# Patient Record
Sex: MALE | Race: BLACK OR AFRICAN AMERICAN | Employment: OTHER | URBAN - METROPOLITAN AREA
[De-identification: names, ages, dates, MRNs, and addresses within clinical notes are randomized per-mention and may not be internally consistent; named-entity substitution may affect disease eponyms.]

---

## 2017-05-11 RX ORDER — ATORVASTATIN CALCIUM 80 MG/1
80 TABLET, FILM COATED ORAL NIGHTLY
Qty: 30 TABLET | Refills: 0 | Status: ON HOLD
Start: 2017-05-11 | End: 2017-07-26 | Stop reason: HOSPADM

## 2017-05-11 RX ORDER — CLOPIDOGREL BISULFATE 75 MG/1
75 TABLET ORAL DAILY
Qty: 30 TABLET | Refills: 0 | Status: ON HOLD
Start: 2017-05-11 | End: 2017-07-26 | Stop reason: HOSPADM

## 2017-07-16 PROBLEM — K85.00 IDIOPATHIC ACUTE PANCREATITIS WITHOUT INFECTION OR NECROSIS: Status: ACTIVE | Noted: 2017-07-16

## 2017-07-16 PROBLEM — R50.9 FEVER: Status: ACTIVE | Noted: 2017-07-16

## 2017-07-18 ENCOUNTER — HOSPITAL ENCOUNTER (OUTPATIENT)
Dept: VASCULAR LAB | Age: 52
Discharge: HOME | End: 2017-07-19
Admitting: STUDENT IN AN ORGANIZED HEALTH CARE EDUCATION/TRAINING PROGRAM

## 2017-07-27 ENCOUNTER — HOSPITAL ENCOUNTER (OUTPATIENT)
Dept: INTERVENTIONAL RADIOLOGY/VASCULAR | Age: 52
Discharge: HOME | End: 2017-07-27

## 2017-07-27 DIAGNOSIS — K85.00 IDIOPATHIC ACUTE PANCREATITIS WITHOUT INFECTION OR NECROSIS: ICD-10-CM

## 2017-07-28 ENCOUNTER — HOSPITAL ENCOUNTER (OUTPATIENT)
Dept: WOUND CARE | Age: 52
Discharge: HOME | End: 2017-07-28
Attending: PODIATRIST | Admitting: PODIATRIST

## 2017-07-28 VITALS
TEMPERATURE: 98.6 F | RESPIRATION RATE: 16 BRPM | SYSTOLIC BLOOD PRESSURE: 127 MMHG | DIASTOLIC BLOOD PRESSURE: 58 MMHG | HEART RATE: 94 BPM

## 2017-07-28 LAB
GLUCOSE BLD-MCNC: 420 MG/DL (ref 70–99)
PERFORMED ON: ABNORMAL

## 2017-07-28 RX ORDER — LIDOCAINE HYDROCHLORIDE 40 MG/ML
SOLUTION TOPICAL ONCE
Status: DISCONTINUED | OUTPATIENT
Start: 2017-07-28 | End: 2017-07-29 | Stop reason: HOSPADM

## 2017-08-04 ENCOUNTER — HOSPITAL ENCOUNTER (OUTPATIENT)
Dept: WOUND CARE | Age: 52
Discharge: HOME | End: 2017-08-04
Attending: PODIATRIST | Admitting: PODIATRIST

## 2017-08-04 LAB
GLUCOSE BLD-MCNC: 233 MG/DL (ref 70–99)
PERFORMED ON: ABNORMAL

## 2017-08-04 RX ORDER — LIDOCAINE HYDROCHLORIDE 40 MG/ML
SOLUTION TOPICAL ONCE
Status: DISCONTINUED | OUTPATIENT
Start: 2017-08-04 | End: 2017-08-05 | Stop reason: HOSPADM

## 2017-08-10 ENCOUNTER — TELEPHONE (OUTPATIENT)
Dept: WOUND CARE | Age: 52
End: 2017-08-10

## 2017-08-30 ENCOUNTER — TELEPHONE (OUTPATIENT)
Dept: INTERNAL MEDICINE | Age: 52
End: 2017-08-30

## 2017-08-30 ENCOUNTER — OFFICE VISIT (OUTPATIENT)
Dept: INFECTIOUS DISEASES | Age: 52
End: 2017-08-30

## 2017-08-30 VITALS
BODY MASS INDEX: 23.09 KG/M2 | DIASTOLIC BLOOD PRESSURE: 60 MMHG | SYSTOLIC BLOOD PRESSURE: 138 MMHG | WEIGHT: 175 LBS | TEMPERATURE: 98.2 F

## 2017-08-30 DIAGNOSIS — E11.42 TYPE 2 DIABETES MELLITUS WITH DIABETIC POLYNEUROPATHY, WITH LONG-TERM CURRENT USE OF INSULIN (HCC): Primary | ICD-10-CM

## 2017-08-30 DIAGNOSIS — M46.24 OSTEOMYELITIS OF THORACIC REGION (HCC): ICD-10-CM

## 2017-08-30 DIAGNOSIS — Z79.4 TYPE 2 DIABETES MELLITUS WITH DIABETIC POLYNEUROPATHY, WITH LONG-TERM CURRENT USE OF INSULIN (HCC): Primary | ICD-10-CM

## 2017-08-30 PROCEDURE — 3017F COLORECTAL CA SCREEN DOC REV: CPT | Performed by: INTERNAL MEDICINE

## 2017-08-30 PROCEDURE — G8427 DOCREV CUR MEDS BY ELIG CLIN: HCPCS | Performed by: INTERNAL MEDICINE

## 2017-08-30 PROCEDURE — 4004F PT TOBACCO SCREEN RCVD TLK: CPT | Performed by: INTERNAL MEDICINE

## 2017-08-30 PROCEDURE — G8598 ASA/ANTIPLAT THER USED: HCPCS | Performed by: INTERNAL MEDICINE

## 2017-08-30 PROCEDURE — 99215 OFFICE O/P EST HI 40 MIN: CPT | Performed by: INTERNAL MEDICINE

## 2017-08-30 PROCEDURE — 3046F HEMOGLOBIN A1C LEVEL >9.0%: CPT | Performed by: INTERNAL MEDICINE

## 2017-08-30 PROCEDURE — G8420 CALC BMI NORM PARAMETERS: HCPCS | Performed by: INTERNAL MEDICINE

## 2017-08-31 ENCOUNTER — OFFICE VISIT (OUTPATIENT)
Dept: INTERNAL MEDICINE | Age: 52
End: 2017-08-31
Attending: STUDENT IN AN ORGANIZED HEALTH CARE EDUCATION/TRAINING PROGRAM

## 2017-08-31 VITALS
RESPIRATION RATE: 18 BRPM | HEART RATE: 105 BPM | TEMPERATURE: 98.5 F | DIASTOLIC BLOOD PRESSURE: 66 MMHG | SYSTOLIC BLOOD PRESSURE: 126 MMHG | OXYGEN SATURATION: 99 %

## 2017-08-31 DIAGNOSIS — Z12.12 SCREENING FOR COLORECTAL CANCER: ICD-10-CM

## 2017-08-31 DIAGNOSIS — Z12.11 SCREENING FOR COLORECTAL CANCER: ICD-10-CM

## 2017-08-31 DIAGNOSIS — D64.9 ANEMIA, UNSPECIFIED TYPE: ICD-10-CM

## 2017-08-31 DIAGNOSIS — E10.8 TYPE 1 DIABETES MELLITUS WITH COMPLICATION (HCC): Primary | ICD-10-CM

## 2017-08-31 LAB
GLUCOSE BLD-MCNC: 278 MG/DL (ref 70–99)
PERFORMED ON: ABNORMAL

## 2017-08-31 RX ORDER — LOSARTAN POTASSIUM 25 MG/1
50 TABLET ORAL DAILY
Qty: 30 TABLET | Refills: 3 | Status: SHIPPED | OUTPATIENT
Start: 2017-08-31 | End: 2017-09-21 | Stop reason: ALTCHOICE

## 2017-08-31 RX ORDER — ACETAMINOPHEN 325 MG/1
650 TABLET ORAL EVERY 4 HOURS PRN
Qty: 120 TABLET | Refills: 3 | Status: ON HOLD | OUTPATIENT
Start: 2017-08-31 | End: 2018-01-24 | Stop reason: HOSPADM

## 2017-08-31 RX ORDER — MULTIVIT-MIN/IRON FUM/FOLIC AC 7.5 MG-4
1 TABLET ORAL DAILY
Qty: 30 TABLET | Refills: 3 | Status: SHIPPED | OUTPATIENT
Start: 2017-08-31 | End: 2017-12-26 | Stop reason: SDUPTHER

## 2017-08-31 RX ORDER — ATORVASTATIN CALCIUM 80 MG/1
80 TABLET, FILM COATED ORAL DAILY
Qty: 30 TABLET | Refills: 3 | Status: SHIPPED | OUTPATIENT
Start: 2017-08-31 | End: 2018-06-07 | Stop reason: SDUPTHER

## 2017-08-31 RX ORDER — CARVEDILOL 25 MG/1
25 TABLET ORAL 2 TIMES DAILY WITH MEALS
Qty: 60 TABLET | Refills: 3 | Status: SHIPPED | OUTPATIENT
Start: 2017-08-31 | End: 2017-12-26 | Stop reason: SDUPTHER

## 2017-08-31 RX ORDER — CLOPIDOGREL BISULFATE 75 MG/1
75 TABLET ORAL DAILY
Qty: 30 TABLET | Refills: 3 | Status: SHIPPED | OUTPATIENT
Start: 2017-08-31 | End: 2018-06-07 | Stop reason: SDUPTHER

## 2017-08-31 RX ORDER — FUROSEMIDE 20 MG/1
20 TABLET ORAL DAILY
Qty: 60 TABLET | Refills: 3 | Status: ON HOLD | OUTPATIENT
Start: 2017-08-31 | End: 2017-10-11 | Stop reason: HOSPADM

## 2017-09-01 ENCOUNTER — TELEPHONE (OUTPATIENT)
Dept: INTERNAL MEDICINE | Age: 52
End: 2017-09-01

## 2017-09-01 RX ORDER — INSULIN GLARGINE 100 [IU]/ML
28 INJECTION, SOLUTION SUBCUTANEOUS NIGHTLY
Qty: 1 VIAL | Refills: 3 | OUTPATIENT
Start: 2017-09-01 | End: 2017-09-21 | Stop reason: SDUPTHER

## 2017-09-05 ENCOUNTER — HOSPITAL ENCOUNTER (OUTPATIENT)
Dept: WOUND CARE | Age: 52
Discharge: HOME | End: 2017-09-05
Attending: PODIATRIST | Admitting: PODIATRIST

## 2017-09-05 VITALS
RESPIRATION RATE: 16 BRPM | DIASTOLIC BLOOD PRESSURE: 69 MMHG | SYSTOLIC BLOOD PRESSURE: 118 MMHG | HEART RATE: 90 BPM | TEMPERATURE: 98 F

## 2017-09-05 DIAGNOSIS — L97.513 FOOT ULCER, RIGHT, WITH NECROSIS OF MUSCLE (HCC): ICD-10-CM

## 2017-09-05 DIAGNOSIS — I70.25 ATHEROSCLEROSIS OF NATIVE ARTERIES OF THE EXTREMITIES WITH ULCERATION (HCC): Primary | ICD-10-CM

## 2017-09-05 DIAGNOSIS — I73.9 PAD (PERIPHERAL ARTERY DISEASE) (HCC): ICD-10-CM

## 2017-09-05 LAB
GLUCOSE BLD-MCNC: 167 MG/DL (ref 70–99)
PERFORMED ON: ABNORMAL
PREALBUMIN: 22.5 MG/DL (ref 20–40)
SEDIMENTATION RATE, ERYTHROCYTE: 32 MM/HR (ref 0–20)

## 2017-09-05 RX ORDER — HYDROCODONE BITARTRATE AND ACETAMINOPHEN 5; 325 MG/1; MG/1
1 TABLET ORAL EVERY 4 HOURS PRN
COMMUNITY
End: 2017-09-26

## 2017-09-05 RX ORDER — HYDRALAZINE HYDROCHLORIDE 25 MG/1
25 TABLET, FILM COATED ORAL 3 TIMES DAILY
Status: ON HOLD | COMMUNITY
End: 2018-01-24 | Stop reason: HOSPADM

## 2017-09-05 RX ORDER — LIDOCAINE HYDROCHLORIDE 40 MG/ML
2.5 SOLUTION TOPICAL ONCE
Status: COMPLETED | OUTPATIENT
Start: 2017-09-05 | End: 2017-09-05

## 2017-09-05 RX ADMIN — LIDOCAINE HYDROCHLORIDE 2.5 ML: 40 SOLUTION TOPICAL at 13:51

## 2017-09-05 ASSESSMENT — PAIN DESCRIPTION - ORIENTATION
ORIENTATION_2: LEFT
ORIENTATION: RIGHT

## 2017-09-05 ASSESSMENT — PAIN SCALES - GENERAL: PAINLEVEL_OUTOF10: 7

## 2017-09-05 ASSESSMENT — PAIN DESCRIPTION - LOCATION
LOCATION_2: LEG
LOCATION: FOOT

## 2017-09-05 ASSESSMENT — PAIN DESCRIPTION - PROGRESSION
CLINICAL_PROGRESSION: NOT CHANGED
CLINICAL_PROGRESSION_2: NOT CHANGED

## 2017-09-05 ASSESSMENT — PAIN DESCRIPTION - ONSET
ONSET: ON-GOING
ONSET_2: ON-GOING

## 2017-09-05 ASSESSMENT — PAIN DESCRIPTION - DESCRIPTORS
DESCRIPTORS: ACHING;BURNING
DESCRIPTORS_2: ACHING;BURNING

## 2017-09-05 ASSESSMENT — PAIN DESCRIPTION - FREQUENCY: FREQUENCY: INTERMITTENT

## 2017-09-05 ASSESSMENT — PAIN DESCRIPTION - DURATION: DURATION_2: CONTINUOUS

## 2017-09-05 ASSESSMENT — PAIN DESCRIPTION - INTENSITY: RATING_2: 7

## 2017-09-12 ENCOUNTER — HOSPITAL ENCOUNTER (OUTPATIENT)
Dept: WOUND CARE | Age: 52
Discharge: HOME | End: 2017-09-12
Attending: PODIATRIST | Admitting: PODIATRIST

## 2017-09-12 VITALS
RESPIRATION RATE: 18 BRPM | TEMPERATURE: 99.2 F | HEART RATE: 98 BPM | DIASTOLIC BLOOD PRESSURE: 79 MMHG | SYSTOLIC BLOOD PRESSURE: 164 MMHG

## 2017-09-12 RX ORDER — AMOXICILLIN AND CLAVULANATE POTASSIUM 875; 125 MG/1; MG/1
1 TABLET, FILM COATED ORAL 2 TIMES DAILY
Qty: 28 TABLET | Refills: 0 | Status: SHIPPED | OUTPATIENT
Start: 2017-09-12 | End: 2017-09-26

## 2017-09-12 RX ORDER — LIDOCAINE HYDROCHLORIDE 40 MG/ML
2.5 SOLUTION TOPICAL ONCE
Status: COMPLETED | OUTPATIENT
Start: 2017-09-12 | End: 2017-09-12

## 2017-09-12 ASSESSMENT — PAIN DESCRIPTION - PAIN TYPE: TYPE: ACUTE PAIN

## 2017-09-12 ASSESSMENT — PAIN DESCRIPTION - FREQUENCY: FREQUENCY: INTERMITTENT

## 2017-09-12 ASSESSMENT — PAIN DESCRIPTION - DESCRIPTORS: DESCRIPTORS: SHARP

## 2017-09-12 ASSESSMENT — PAIN DESCRIPTION - LOCATION: LOCATION: FOOT

## 2017-09-12 ASSESSMENT — PAIN DESCRIPTION - ORIENTATION: ORIENTATION: RIGHT

## 2017-09-12 ASSESSMENT — PAIN DESCRIPTION - PROGRESSION: CLINICAL_PROGRESSION: NOT CHANGED

## 2017-09-12 ASSESSMENT — PAIN SCALES - GENERAL: PAINLEVEL_OUTOF10: 10

## 2017-09-12 ASSESSMENT — PAIN DESCRIPTION - ONSET: ONSET: ON-GOING

## 2017-09-15 ENCOUNTER — TELEPHONE (OUTPATIENT)
Dept: INTERNAL MEDICINE | Age: 52
End: 2017-09-15

## 2017-09-15 LAB
GRAM STAIN RESULT: ABNORMAL
ORGANISM: ABNORMAL
WOUND/ABSCESS: ABNORMAL

## 2017-09-21 ENCOUNTER — OFFICE VISIT (OUTPATIENT)
Dept: INTERNAL MEDICINE | Age: 52
End: 2017-09-21
Attending: STUDENT IN AN ORGANIZED HEALTH CARE EDUCATION/TRAINING PROGRAM

## 2017-09-21 ENCOUNTER — TELEPHONE (OUTPATIENT)
Dept: WOUND CARE | Age: 52
End: 2017-09-21

## 2017-09-21 VITALS
OXYGEN SATURATION: 100 % | DIASTOLIC BLOOD PRESSURE: 80 MMHG | HEART RATE: 98 BPM | WEIGHT: 195 LBS | SYSTOLIC BLOOD PRESSURE: 167 MMHG | BODY MASS INDEX: 25.73 KG/M2 | TEMPERATURE: 98.4 F | RESPIRATION RATE: 18 BRPM

## 2017-09-21 DIAGNOSIS — R52 PAIN: Primary | ICD-10-CM

## 2017-09-21 RX ORDER — LOSARTAN POTASSIUM 25 MG/1
25 TABLET ORAL DAILY
Qty: 30 TABLET | Refills: 3 | Status: SHIPPED | OUTPATIENT
Start: 2017-09-21 | End: 2017-10-04 | Stop reason: SDUPTHER

## 2017-09-21 RX ORDER — GABAPENTIN 100 MG/1
100 CAPSULE ORAL 3 TIMES DAILY
Qty: 90 CAPSULE | Refills: 3 | Status: SHIPPED | OUTPATIENT
Start: 2017-09-21 | End: 2017-10-26 | Stop reason: SDUPTHER

## 2017-09-21 RX ORDER — INSULIN GLARGINE 100 [IU]/ML
35 INJECTION, SOLUTION SUBCUTANEOUS NIGHTLY
Qty: 1 VIAL | Refills: 3 | Status: SHIPPED | OUTPATIENT
Start: 2017-09-21 | End: 2017-10-26 | Stop reason: SDUPTHER

## 2017-09-26 ENCOUNTER — OFFICE VISIT (OUTPATIENT)
Dept: VASCULAR SURGERY | Age: 52
End: 2017-09-26

## 2017-09-26 ENCOUNTER — HOSPITAL ENCOUNTER (OUTPATIENT)
Dept: WOUND CARE | Age: 52
Discharge: HOME | End: 2017-09-26
Attending: PODIATRIST | Admitting: PODIATRIST

## 2017-09-26 VITALS
DIASTOLIC BLOOD PRESSURE: 73 MMHG | WEIGHT: 196.8 LBS | SYSTOLIC BLOOD PRESSURE: 141 MMHG | TEMPERATURE: 98.8 F | HEART RATE: 100 BPM | BODY MASS INDEX: 25.96 KG/M2 | OXYGEN SATURATION: 98 %

## 2017-09-26 VITALS
DIASTOLIC BLOOD PRESSURE: 67 MMHG | RESPIRATION RATE: 18 BRPM | SYSTOLIC BLOOD PRESSURE: 129 MMHG | TEMPERATURE: 98 F | HEART RATE: 101 BPM

## 2017-09-26 DIAGNOSIS — I70.25 ATHEROSCLEROSIS OF NATIVE ARTERIES OF THE EXTREMITIES WITH ULCERATION (HCC): Primary | ICD-10-CM

## 2017-09-26 PROCEDURE — 3017F COLORECTAL CA SCREEN DOC REV: CPT | Performed by: SURGERY

## 2017-09-26 PROCEDURE — 99204 OFFICE O/P NEW MOD 45 MIN: CPT | Performed by: SURGERY

## 2017-09-26 PROCEDURE — G8598 ASA/ANTIPLAT THER USED: HCPCS | Performed by: SURGERY

## 2017-09-26 PROCEDURE — 4004F PT TOBACCO SCREEN RCVD TLK: CPT | Performed by: SURGERY

## 2017-09-26 PROCEDURE — G8419 CALC BMI OUT NRM PARAM NOF/U: HCPCS | Performed by: SURGERY

## 2017-09-26 PROCEDURE — G8427 DOCREV CUR MEDS BY ELIG CLIN: HCPCS | Performed by: SURGERY

## 2017-09-26 RX ORDER — SULFAMETHOXAZOLE AND TRIMETHOPRIM 800; 160 MG/1; MG/1
1 TABLET ORAL 2 TIMES DAILY
Qty: 20 TABLET | Refills: 0 | Status: SHIPPED | OUTPATIENT
Start: 2017-09-26 | End: 2017-10-06

## 2017-09-26 RX ORDER — LIDOCAINE HYDROCHLORIDE 40 MG/ML
2.5 SOLUTION TOPICAL ONCE
Status: COMPLETED | OUTPATIENT
Start: 2017-09-26 | End: 2017-09-26

## 2017-09-26 RX ADMIN — LIDOCAINE HYDROCHLORIDE 2.5 ML: 40 SOLUTION TOPICAL at 13:45

## 2017-09-26 ASSESSMENT — PAIN DESCRIPTION - LOCATION: LOCATION: FOOT

## 2017-09-26 ASSESSMENT — ENCOUNTER SYMPTOMS
GASTROINTESTINAL NEGATIVE: 1
RESPIRATORY NEGATIVE: 1
EYES NEGATIVE: 1

## 2017-09-26 ASSESSMENT — PAIN DESCRIPTION - PAIN TYPE: TYPE: ACUTE PAIN

## 2017-09-26 ASSESSMENT — PAIN DESCRIPTION - ORIENTATION: ORIENTATION: RIGHT

## 2017-09-26 ASSESSMENT — PAIN DESCRIPTION - DESCRIPTORS: DESCRIPTORS: ACHING

## 2017-09-29 ENCOUNTER — TELEPHONE (OUTPATIENT)
Dept: VASCULAR SURGERY | Age: 52
End: 2017-09-29

## 2017-10-04 RX ORDER — LOSARTAN POTASSIUM 25 MG/1
25 TABLET ORAL DAILY
Qty: 30 TABLET | Refills: 5 | Status: ON HOLD
Start: 2017-10-04 | End: 2017-10-11 | Stop reason: HOSPADM

## 2017-10-06 NOTE — TELEPHONE ENCOUNTER
Still na from patient, if pt shows up for appt on 10/10 please have him see Buck Wilhelm to reschedule

## 2017-10-10 ENCOUNTER — HOSPITAL ENCOUNTER (OUTPATIENT)
Dept: WOUND CARE | Age: 52
Discharge: HOME | End: 2017-10-10
Attending: PODIATRIST | Admitting: PODIATRIST

## 2017-10-10 VITALS
RESPIRATION RATE: 18 BRPM | DIASTOLIC BLOOD PRESSURE: 52 MMHG | SYSTOLIC BLOOD PRESSURE: 85 MMHG | HEART RATE: 125 BPM | TEMPERATURE: 98.2 F

## 2017-10-10 DIAGNOSIS — I70.25 ATHEROSCLEROSIS OF NATIVE ARTERIES OF OTHER EXTREMITIES WITH ULCERATION (HCC): ICD-10-CM

## 2017-10-10 LAB
GLUCOSE BLD-MCNC: 469 MG/DL (ref 70–99)
PERFORMED ON: ABNORMAL

## 2017-10-10 RX ORDER — LIDOCAINE HYDROCHLORIDE 40 MG/ML
2.5 SOLUTION TOPICAL ONCE
Status: COMPLETED | OUTPATIENT
Start: 2017-10-10 | End: 2017-10-10

## 2017-10-10 RX ADMIN — LIDOCAINE HYDROCHLORIDE 2.5 ML: 40 SOLUTION TOPICAL at 14:24

## 2017-10-10 ASSESSMENT — PAIN SCALES - GENERAL: PAINLEVEL_OUTOF10: 8

## 2017-10-10 ASSESSMENT — PAIN DESCRIPTION - PAIN TYPE: TYPE: ACUTE PAIN

## 2017-10-10 ASSESSMENT — PAIN DESCRIPTION - DESCRIPTORS: DESCRIPTORS: SHARP

## 2017-10-10 ASSESSMENT — PAIN DESCRIPTION - ORIENTATION: ORIENTATION: RIGHT

## 2017-10-10 ASSESSMENT — PAIN DESCRIPTION - LOCATION: LOCATION: FOOT

## 2017-10-10 NOTE — PROGRESS NOTES
[facility] [place]  Progress Note and Procedure Note      Maricarmen Barnett  MEDICAL RECORD NUMBER:  [id number]  AGE: 47 y.o. GENDER: male  : 1965  EPISODE DATE:  10/10/2017    Subjective:       Chief Complaint   Patient presents with    Wound Check     f/u right heel & Foot         HISTORY of PRESENT ILLNESS HPI     Maricarmen Barnett is a 47 y.o. male who presents today for wound evaluation. History of Wound Context:  RIGHT posterior heel wound of diabetic neuropathic with sheer  / pressure. Patient relates that he has been discharged from his ECF where he was receiving wound care. He relates that he was discharged without any home health care set up for wound care. He admits that his blood sugars have been high. Patient relates that he has followed up with vascular surgery and is going to be scheduled for a CTA. Patient is confused and when questions states that he has not taken any isulin for a couple of days then stated that he just took some in the car on the way to his appointment.   Wound Pain Timing/Severity: none  Quality of pain: N/A  Severity:  0 / 10   Modifying Factors: None  Associated Signs/Symptoms: drainage and numbness    Ulcer Identification:  Ulcer Type: diabetic and neuropathic  Contributing Factors: edema, diabetes, poor glucose control, shear force and arterial insufficiency          PAST MEDICAL HISTORY        Diagnosis Date    Clostridium difficile diarrhea 2017    PCR    Diabetes mellitus ([address]     Hyperlipidemia     Hypertension     MDRO (multiple drug resistant organisms) resistance 2017    leg    MRSA (methicillin resistant staph aureus) culture positive 06/15/2016    foot; bone; blood; chest abscess    MRSA (methicillin resistant staph aureus) culture positive 2017    foot    Osteomyelitis of left foot ([address]     Type II or unspecified type diabetes mellitus without mention of complication, not stated as uncontrolled     VRE (vancomycin near syncopal past 2x       MEDICATIONS    Current Outpatient Prescriptions on File Prior to Encounter   Medication Sig Dispense Refill    losartan (COZAAR) 25 MG tablet Take 1 tablet by mouth daily 30 tablet 5    insulin glargine (LANTUS) 100 UNIT/ML injection vial Inject 35 Units into the skin nightly 1 vial 3    insulin aspart (NOVOLOG) 100 UNIT/ML injection vial Inject 5 Units into the skin 3 times daily (before meals) 1 vial 3    gabapentin (NEURONTIN) 100 MG capsule Take 1 capsule by mouth 3 times daily 90 capsule 3    hydrALAZINE (APRESOLINE) 25 MG tablet Take 25 mg by mouth 3 times daily      apixaban (ELIQUIS) 5 MG TABS tablet Take 1 tablet by mouth 2 times daily 60 tablet 3    atorvastatin (LIPITOR) 80 MG tablet Take 1 tablet by mouth daily 30 tablet 3    clopidogrel (PLAVIX) 75 MG tablet Take 1 tablet by mouth daily 30 tablet 3    carvedilol (COREG) 25 MG tablet Take 1 tablet by mouth 2 times daily (with meals) 60 tablet 3    acetaminophen (AMINOFEN) 325 MG tablet Take 2 tablets by mouth every 4 hours as needed for Pain 120 tablet 3    Multiple Vitamins-Minerals (MULTIVITAMIN WITH MINERALS) tablet Take 1 tablet by mouth daily 30 tablet 3    furosemide (LASIX) 20 MG tablet Take 1 tablet by mouth daily 60 tablet 3    bismuth subsalicylate (BISMATROL) 262 MG/15ML suspension Take 30 mLs by mouth 4 times daily as needed for Diarrhea  3     No current facility-administered medications on file prior to encounter. REVIEW OF SYSTEMS    Pertinent items are noted in HPI. Review of Systems: A 12 point review of symptoms is unremarkable with the exception of the chief complaint. Patient specifically denies nausea, fever, vomiting, chills, shortness of breath, chest pain, abdominal pain, constipation or difficulty urinating. Patient is confused and somnolent.          Objective:      BP (!) 85/52   Pulse 125   Temp 98.2 °F (36.8 °C) (Oral)   Resp 18     Wt Readings from Last 3 Encounters: 09/26/17 196 lb 12.8 oz (89.3 kg)   09/21/17 195 lb (88.5 kg)   08/30/17 175 lb (79.4 kg)       PHYSICAL EXAM    General Appearance: alert and oriented to person, place and time, well-developed and well-nourished, in no acute distress  Pitting edema noted on the bilateral lower extremities. TMA noted on the left lower extremity with no open wounds noted. Wound noted on the posterior aspect of the right lower extremity. This has deteriorated significantly since he was discharged from the hospital and was last seen at clinic. Wound has fibrotic and nonviable tissue that extends down through and includes the subcutaneous tissue/muscle/fascia. After debridement the wound has a fibrous base. There is  surrounding erythema and warmth noted and there is persistent malodor noted. The wound does not probe or track to bone.         Assessment:     Patient Active Problem List   Diagnosis    Osteomyelitis ([address]    Dyslipidemia    Carotid bruit present    S/P amputation    Abscess of third toe of left foot    [facility]abetic neuropathy [facility]    Ulcer of toe of left foot ([facility]    Onychomycosis    Pain of right heel    PVD [facility]peripheral vascular disease) [facility]    Abscess of foot    Acute renal failure (ARF) [facility]    Dehydration with hyponatremia    Diabetic infection of right foot ([address]    TUTU (acute kidney injury) ([address]    Uncontrolled type 1 diabetes mellitus ([address]    Hypertension    Non compliance w medication regimen    PAD (peripheral artery disease) ([address]    History of osteomyelitis    Anemia    PAD [facility]peripheral artery disease) ([facility]    Foot ulcer ([address]    Diabetic foot ulcer ([address]    Atherosclerosis of native arteries of the extremities with ulceration ([address]    Foot osteomyelitis ([address]    Edema of lower extremity    Wound infection    Wound dehiscence    Post-op pain    Osteomyelitis of left foot ([address]    Surgical wound dehiscence    VRE infection (vancomycin resistant Enterococcus)    Pseudomonas infection    Type 2 diabetes mellitus with diabetic polyneuropathy (HCC)    Type 1 diabetes mellitus with diabetic peripheral angiopathy with gangrene ([address]    Diabetic foot ulcer with osteomyelitis ([address]    Atherosclerosis of native artery of left lower extremity with ulceration of calf (HCC)    Failure of artificial skin graft    Pain management contract agreement    Diabetic ulcer of left foot associated with type 2 diabetes mellitus ([address]    Atherosclerosis of native artery of right lower extremity with ulceration of calf (HCC)    Sepsis ([address]    Left-sided chest wall pain    Essential hypertension    Pure hypercholesterolemia    Chronic osteomyelitis of left foot (HCC)    Elevated troponin    Transaminitis    Pleural effusion    Chest wall abscess    Pneumonia of both lower lobes due to infectious organism    Abscess of chest wall    Osteomyelitis of thoracic region ([address]    Pyogenic inflammation of bone ([address]    Toe gangrene ([address]    Diabetic ketoacidosis without coma associated with type 2 diabetes mellitus ([address]    Diabetic ketoacidosis with coma associated with type 1 diabetes mellitus ([address]    Somnolence    Acute right-sided thoracic back pain    Depression    Encounter for palliative care    Dysphagia    Acute hypoxemic respiratory failure (HCC)    Acute septic pulmonary embolism without acute cor pulmonale (HCC)    MRSA bacteremia    Pain    Encephalopathy acute    Constipation    Acute biliary pancreatitis without infection or necrosis    Fever    Idiopathic acute pancreatitis without infection or necrosis         Excisional Debridement Procedure Note  Indications:  Based on my examination of this patient's wound(s)/ulcer(s) today, debridement is required to promote healing and evaluate the wound base. Performed by: Gautam Nielsen DPM    Consent obtained?  Yes    Time out taken: Yes    Pain Control: Anesthetic: 4% Topical Xylocaine     Debridement: Davis 1 9/26/2017  1:33 PM   Wound Cleansed Rinsed/Irrigated with saline 9/26/2017  1:33 PM   Wound Length (cm) 0.6 cm 9/26/2017  1:33 PM   Wound Width (cm) 0.8 cm 9/26/2017  1:33 PM   Wound Depth (cm)  0.1 9/26/2017  1:33 PM   Calculated Wound Size (cm^2) (l*w) 0.48 cm^2 9/26/2017  1:33 PM   Undermining Starts ___ O'Clock 0 9/26/2017  1:33 PM   Undermining Maxium Distance (cm) 0 9/26/2017  1:33 PM   Wound Assessment Red;Yellow 9/26/2017  1:33 PM   Margins Defined edges 9/26/2017  1:33 PM   Allyson-wound Assessment Brown 9/26/2017  1:33 PM   Non-staged Wound Description Full thickness 9/26/2017  1:33 PM   Red%Wound Bed 50 9/26/2017  1:33 PM   Yellow%Wound Bed 50 9/26/2017  1:33 PM   Drainage Amount Scant 9/26/2017  1:33 PM   Drainage Description Serosanguinous 9/26/2017  1:33 PM   Odor None 9/26/2017  1:33 PM   Number of days: 14     Percent of Wound Debrided: 100%    Total Surface Area Debrided:  18 sq cm     Diabetic/Pressure/Non Pressure Ulcers:  Ulcer: Diabetic ulcer, muscle necrosis    Bleeding:  Minimal    Hemostasis Achieved:  by pressure    Procedural Pain:  0  / 10     Post Procedural Pain:  0 / 10     Response to treatment:  Well tolerated by patient. Plan:   E/M x 30 minutes of a new problem of AMS. Due to patient significantly elevated blood sugar (near 500), hypotension, tachycardia and AMS he was taken to the ED for further evaluation and management. Patient refused to go to the ED. Spoke with patient's sister Natalia Glynn, who thomas his to the appointment, about his symptoms and she agreed that he should go to the ED and she relates that she will make sure he stays to be evaluated and he stated that he was ;eaving once he got there. Concern for sepsis vs acidosis. Although patient's wound is very fibrotic is is not red, hot or swollen. He has a history of OM of the spine and chest wall so there is concern for another source besides the foot.      Patient will be referred to vascular surgery as the Wound Location: Right Plantar Foot     [x] Apply Primary Dressing to wound:       [] Foam/Foam with Border  [] Mepitel/Non-adherent/Xeroform   [] Alginate with Silver    [] Alginate   [] Collagen [] Collagen with Silver     [] Hydrocolloid  [] Hydrafera Blue moistened with saline   [] MediHoney Paste/Gel [] Hydrogel      [x] Santyl with Moisten saline gauze    [] Bactroban/Mupirocin [] Polysporin  [] Other:      Pack wound loosely with  [] Iodoform   [] Plain Packing  [] Other      [x] Cover and Secure with:     [x] Gauze [] ABD [] Stretch bandage roll [] Kerlix   [] Coban [] Ace Wrap [] Cover Roll Tape    [] Other:    Avoid contact of tape with skin. [x] Change dressing: [x] Daily    [] Every Other Day [] Three times per week   [] Once a week [] Do Not Change Dressing   [] Other:                               Off-Loading:   [] Off-loading when: [] walking  [] in bed [] sitting    [] Total non-weight bearing:  [] Right Leg  [] Left Leg     [] Partial weight bearing:   [] Right Leg  [] Left Leg     [x] Assistive Device: [] Walker [] Crutches  [] Wheelchair [] Roll About   [] Surgical shoe/Darco    [] Podus Boot(s)   [x] Prevalon Boot(s) to bilat heels  [] CROW Boot    [] [employer] [] Other:      Dietary:  Important dietary reminders:  1. Increase Protein intake (i.e. Lean meats, fish, eggs, legumes, and yogurt)  2. No added salt  3. If diabetic, follow a diabetic diet and check glucose prior to meals or as instructed by your physician.         Return Appointment:  [] Wound and dressing supply provider:   [] ECF or Home Healthcare:  [] Nurse visit:    [x] Return Appointment: With Dr Gurwinder Strong  in  1Week(s)    [] Ordered tests:       Consults: [] Weight Management [] Diabetes Education [] Vascular Surgery  [] Endocrinology [] Nutrition Counseling  [] Lymphedema Therapy     Your nurse  is:  Jerson Cruz [address]     Electronically signed by Javi Prince RN on 10/10/2017 at [address] Information: Should you

## 2017-10-11 ENCOUNTER — TELEPHONE (OUTPATIENT)
Dept: ENT CLINIC | Age: 52
End: 2017-10-11

## 2017-10-11 NOTE — TELEPHONE ENCOUNTER
Patient needs to have dup, cta and fu with Dr. Ant Spann. Pt is in hospital being dced now. Pt. Refused exam at hospital anb being dced ama. Called pt at hospital, he is very lethargic from the meds but stated he will call me tomorrow. I called his phone and lvm with my phone number.

## 2017-10-13 ENCOUNTER — TELEPHONE (OUTPATIENT)
Dept: ENT CLINIC | Age: 52
End: 2017-10-13

## 2017-10-13 NOTE — TELEPHONE ENCOUNTER
Patient notified that CTA is scheduled for Monday 10/16 at 0800, arrive at [address]. NPO 4 hrs prior. Art Dup 10/16 at 1000. Dr. Eunice Couch will review results with pt in office on 10/17. Pt verbalized understanding and wcb with any questions or change in symptoms.

## 2017-10-16 ENCOUNTER — TELEPHONE (OUTPATIENT)
Dept: ENT CLINIC | Age: 52
End: 2017-10-16

## 2017-10-16 NOTE — TELEPHONE ENCOUNTER
Patient notified that due to him not showing up for is CT and duplex today at the hospital he will need to reschedule his tests with Central Scheduling and also reschedule his appointment with Dr. Ave Sequeira for tomorrow. Also notified pt that if he has any changes in his foot or leg that he needs to go to ER to have it evaluated. Pt. Given number to central scheduling and our office to set up appointments, pt verbalized understanding and wcb if he has any questions.

## 2017-10-18 ENCOUNTER — HOSPITAL ENCOUNTER (OUTPATIENT)
Dept: VASCULAR LAB | Age: 52
Discharge: HOME | End: 2017-10-18
Attending: SURGERY | Admitting: SURGERY

## 2017-10-18 ENCOUNTER — TELEPHONE (OUTPATIENT)
Dept: VASCULAR SURGERY | Age: 52
End: 2017-10-18

## 2017-10-18 DIAGNOSIS — I70.25 ATHEROSCLEROSIS OF NATIVE ARTERIES OF OTHER EXTREMITIES WITH ULCERATION (HCC): ICD-10-CM

## 2017-10-18 DIAGNOSIS — I73.9 PAD (PERIPHERAL ARTERY DISEASE) (HCC): Primary | ICD-10-CM

## 2017-10-18 DIAGNOSIS — L97.519 ULCER OF RIGHT FOOT, UNSPECIFIED ULCER STAGE (HCC): ICD-10-CM

## 2017-10-18 DIAGNOSIS — I70.25 ATHEROSCLEROSIS OF NATIVE ARTERIES OF THE EXTREMITIES WITH ULCERATION (HCC): ICD-10-CM

## 2017-10-18 NOTE — TELEPHONE ENCOUNTER
Patient got upset with CT bc they had stick him several times to start the IV (per CT pt was slurring, jittery and would not hold still), and walked out of the CT-A. I explained to the patient again that Dr. Jasmin Steele needs the CT-A to evaluate what to do next with his leg/foot. I advised the patient once again that it is very important for him to get the test and gave him the number to reschedule . The patient continues to no-show, cancel and refuse any advise that I give him in regards to care for himself and testing. This is the 4th time we have attempted to get testing on the patient.

## 2017-10-26 ENCOUNTER — OFFICE VISIT (OUTPATIENT)
Dept: INTERNAL MEDICINE | Age: 52
End: 2017-10-26
Attending: SURGERY

## 2017-10-26 ENCOUNTER — HOSPITAL ENCOUNTER (OUTPATIENT)
Dept: CT IMAGING | Age: 52
Discharge: HOME | End: 2017-10-26
Admitting: SURGERY

## 2017-10-26 VITALS
DIASTOLIC BLOOD PRESSURE: 74 MMHG | HEIGHT: 73 IN | RESPIRATION RATE: 20 BRPM | OXYGEN SATURATION: 99 % | WEIGHT: 197 LBS | TEMPERATURE: 98.1 F | HEART RATE: 95 BPM | SYSTOLIC BLOOD PRESSURE: 158 MMHG | BODY MASS INDEX: 26.11 KG/M2

## 2017-10-26 DIAGNOSIS — I70.25 ATHEROSCLEROSIS OF NATIVE ARTERIES OF OTHER EXTREMITIES WITH ULCERATION (HCC): ICD-10-CM

## 2017-10-26 DIAGNOSIS — I70.25 ATHEROSCLEROSIS OF NATIVE ARTERY OF LOWER EXTREMITY WITH ULCERATION OF CALF, UNSPECIFIED LATERALITY (HCC): ICD-10-CM

## 2017-10-26 DIAGNOSIS — D64.9 ANEMIA, UNSPECIFIED TYPE: ICD-10-CM

## 2017-10-26 DIAGNOSIS — N17.9 ACUTE RENAL FAILURE, UNSPECIFIED ACUTE RENAL FAILURE TYPE (HCC): ICD-10-CM

## 2017-10-26 DIAGNOSIS — E10.8 TYPE 1 DIABETES MELLITUS WITH COMPLICATION (HCC): Primary | ICD-10-CM

## 2017-10-26 RX ORDER — INSULIN GLARGINE 100 [IU]/ML
40 INJECTION, SOLUTION SUBCUTANEOUS NIGHTLY
Qty: 1 VIAL | Refills: 3 | Status: ON HOLD | OUTPATIENT
Start: 2017-10-26 | End: 2017-11-16

## 2017-10-26 RX ORDER — GABAPENTIN 100 MG/1
200 CAPSULE ORAL 3 TIMES DAILY
Qty: 90 CAPSULE | Refills: 3 | Status: SHIPPED | OUTPATIENT
Start: 2017-10-26 | End: 2017-12-26 | Stop reason: SDUPTHER

## 2017-10-26 RX ORDER — ACETAMINOPHEN 325 MG/1
650 TABLET ORAL EVERY 6 HOURS PRN
Qty: 120 TABLET | Refills: 3 | Status: ON HOLD | OUTPATIENT
Start: 2017-10-26 | End: 2018-03-12 | Stop reason: HOSPADM

## 2017-10-26 NOTE — PROGRESS NOTES
Department Of Internal Medicine  General Medicine/Primary Care  Established Patient Visit    Patient:  Akanksha Rogerspool                                               : 1965  Age: 47 y.o. MRN: [id number]  Date : 10/26/2017    History Obtained From:  patient    REASON FOR VISIT:  Follow-up    HISTORY OF PRESENT ILLNESS:   Mr. Marifer Obando is a 49yo gentleman with a history of type 1 DM, HTN, PAD, hx of DVT who presents for follow-up visit. Brought his glucometer only checked sugar few times this month. Most recent sugar was 295 this morning. He is taking lantus at 30u instead of 35. He has knee pain requesting tylenol #3. He is now in wheel chair due to knee pain. Due to see Dr. Paty Jane in early November.     Denies chest pain, dyspnea, abdominal pain, n/v.    Past Medical History:        Diagnosis Date    Clostridium difficile diarrhea 2017    PCR    Diabetes mellitus ([address]     Hyperlipidemia     Hypertension     MDRO (multiple drug resistant organisms) resistance 2017    leg    MRSA (methicillin resistant staph aureus) culture positive 06/15/2016    foot; bone; blood; chest abscess    MRSA (methicillin resistant staph aureus) culture positive 2017    foot    Osteomyelitis of left foot ([address]     Type II or unspecified type diabetes mellitus without mention of complication, not stated as uncontrolled     VRE (vancomycin resistant enterococcus) culture positive 8/25/15    foot       Past Surgical History:        Procedure Laterality Date    ABSCESS DRAINAGE Left 2016    Dr. Wil Woodson - chest wall, I&D of phlegmon, placement of wound vac    FEMORAL-TIBIAL BYPASS GRAFT Left 2015    FIRST RIB REMOVAL Left 2016    Dr. Wil Woodson - via trans-axillary approach    FOOT DEBRIDEMENT Left 2015    FOOT DEBRIDEMENT Left 2015    FOOT DEBRIDEMENT Left 2015    w/delayed primary closure    FOOT SURGERY Left 2015    INCISION AND DRAINAGE LEFT FOOT WITH DEBRIDEMENT    FOOT 8/31/17  Yes Hailey Bruner MD   atorvastatin (LIPITOR) 80 MG tablet Take 1 tablet by mouth daily 8/31/17  Yes Hailey Bruner MD   clopidogrel (PLAVIX) 75 MG tablet Take 1 tablet by mouth daily 8/31/17  Yes Hailey Bruner MD   carvedilol (COREG) 25 MG tablet Take 1 tablet by mouth 2 times daily (with meals) 8/31/17  Yes Hailey Bruner MD   acetaminophen (AMINOFEN) 325 MG tablet Take 2 tablets by mouth every 4 hours as needed for Pain 8/31/17  Yes Hailey Bruner MD   Multiple Vitamins-Minerals (MULTIVITAMIN WITH MINERALS) tablet Take 1 tablet by mouth daily 8/31/17  Yes Hailey Bruner MD       Review of Systems   All other systems reviewed and are negative. Physical Exam:      Vitals: BP (!) 158/74 (Site: Right Arm, Position: Sitting, Cuff Size: Medium Adult)   Pulse 95   Temp 98.1 °F (36.7 °C) (Oral)   Resp 20   Ht 6' 1\" (1.854 m)   Wt 197 lb (89.4 kg)   SpO2 99%   BMI 25.99 kg/m²     Body mass index is 25.99 kg/m². Wt Readings from Last 3 Encounters:   10/26/17 197 lb (89.4 kg)   10/10/17 190 lb (86.2 kg)   09/26/17 196 lb 12.8 oz (89.3 kg)       · Gen - Alert, no signs of distress, appears stated age and cooperative  · HEENT - NC/AT  · Eye - Normal external eye, conjunctiva, lids cornea, PERLLA, no nystagmus  · Ears - Normal TM's bilaterally. Normal auditory canals and external ears. Non-tender  · Nose - Normal external nose, mucus membranes and septum  · Pharynx - Dental Hygiene adequate. Normal buccal mucosa. Normal pharynx  · Neck / Thyroid - Supple, no masses, nodes, nodules or enlargement. No adenopathy, no carotid bruit, no JVD, supple, symmetrical, trachea midline and thyroid not enlarged, symmetric, no tenderness/mass/nodules  · CVS - Regular rate. Regular rhythm. No mumur or rub. 1+ edema b/l  · Resp - No accessory muscle use. No crackles. No wheezing. No rhonchi  · GI - Non-tender. Non-distended. No masses. No organmegaly. Normal bowel sounds  · Skin - Warm and dry. No nodule on exposed extremities.  No rash on exposed extremities  · Lymph - No cervical LAD. No supraclavicular LAD. · MSK - R great toe amputation and heel ulcer wrapped. He has small ulcer on R 5th toe. L foot s/p trans-metatarsal amputation. Has 1+ edema b/l  · Neuro - Awake. Follows commands. CN II-XII Grossly Intact. Sensation intact. Strength 5/5 UE and LE. Reflexes 1+ UE and LE kristian. Downgoing plantar kristian. Normal Gait. Finger-to-nose and rapidly alternating movements intact. Normal pain response  · Psych - Oriented to person, place, time. No anxiety or agitation. LABS:    Chemistry:  No results for input(s): BUN, CREATININE, NA, K, CO2, CL, MG, PHOS, AST, ALT, ALB, PROT in the last 72 hours. Invalid input(s): GLU, CA, TBILI, DBILI, ALP, GLUFASTING    No results for input(s): ALKPHOS, ALT, AST, PROT, BILITOT, BILIDIR, LABALBU in the last 72 hours. [unfilled]    No results for input(s): Gertrude Olea, LABMICR, MICROALBUR, Beatriz Coon in the last 72 hours. Lab Results   Component Value Date    TSH 1.37 04/23/2014       Hematology:  No results for input(s): WBC, HGB, HCT, PLT, MCV, MCH, MCHC, RDW, EOSABS in the last 72 hours. Invalid input(s): NEUTP, LYMPHP, MONOSP, EOSP, BASOP, NEUTABS, LYMPHABS, MONOABS, BASOABS    Lab Results   Component Value Date    IRON 98 07/17/2017    TIBC 120 (L) 07/17/2017    FERRITIN 3,803.0 (H) 07/17/2017       Lipid:  Lab Results   Component Value Date    CHOL 189 07/14/2017    HDL 36 (L) 07/14/2017    LDLCALC 99 07/14/2017    TRIG 269 (H) 07/14/2017       U/A:  Lab Results   Component Value Date    LABMICR Yes 10/10/2017       Imaging:   No results found.     EKG:   Health Maintenance   Flex Sig/ Colonoscopy: referral to Dr. Kari james   PSA: defer   INFLUENZA: defer              PNEUMONIA VACCINE: has 2014   Tdap: defer       Active Problems:     Type 1 Diabetes Mellitus  HFpEF  HTN  HLD  PAD  Hx of PE  Anemia of chronic disease    Assessment/Plan:     Type 1 Diabetes Mellitus  - sugars still not controlled, 295 this morning I encouraged that was good he brought his glucometer but he needs to check his suagrs 2-3 times per day  - Advised him to reduce carb and sodium intake  - Advised him to take Lantus 40 units nightly  -Continue Novolog 5 units TID  - A1C was 8.4 in 7/19/2017 will recheck today  - now taking losartan  - Sees Pod[facility]ry for [facility], f/u Dr. Aby Mancera November  - Reminded him to call for eye exam again    Right Diabetic foot wound  - Management per Dr. Aby Mancera, may need vascular consultation  - Will increase Gabapentin to 200mg TID for pain   - refilled tylenol    Knee Pain  - Will try knee brace    HLD  - continue atorvastatin    HFpEF  - LVEF 65% in 07/18/2017  - has 1+ lower extremity edema which is non-pitting  - continue compression stockings     HTN (167/80 today but not taking his cozaar)  - continue coreg 25mg BID  - continue cozaar 50mg daily    PAD  - continue plavix 75mg daily    Hx of PE  - continue eliquis 5mg BID    Anemia of chronic disease  - check CBC, last hemoglobin 11.8 and normocytic anemia    Health Maintenance  - colonoscopy referral last visit  - had PCV23 in 2014  - continue multivitamin  - Told him to get flu vaccine this season    Case will be discussed with preceptor. MD Viky Orantes  Pager: [phone]  10/26/2017, 2:28 PM     Addendum to Resident H& P/Progress note:  I have personally seen,examined and evaluated the patient.  I have reviewed the current history, physical findings, labs and assessment and plan and agree with note as documented by resident MD ( Ariadne Rooney)      Ruy Amezcua MD, Farhana Weston

## 2017-10-26 NOTE — PATIENT INSTRUCTIONS
Before any of you medication is completely gone, call your pharmacy AT LEAST 1 WEEK ahead for refills. Review all information regarding your medication before starting. If you become ill when the clinic is closed, please call the [facility] [employer]  at   #[phone] and ask the  to page the AOD between 6:00 AM and 6:00 PM or page the AON between 6:00 PM and 6:00 am    Labs are done Tuesday thru Friday from 8:00 to 9:00 AM. For fasting labs do not eat anything for 12 hours prior. You may drink water. The clinic is not able to process MY CHART requests for appointments or messages including requests. Please call the [address] [place] [address] at [phone]  For appointments and messages. Call your pharmacy for medication refills. bloodwork done today.     Return to clinic in 1-2 months    Lantus Insulin inject 40 units into the skin at night    Continue medication as listed on discharge sheet    Instructions reviewed before discharge and copy given to patient    [address] [phone]

## 2017-10-26 NOTE — PROGRESS NOTES
[facility]       NURSING PROGRESS NOTE      October 26, 2017  Kyle Mckinney    Chief Complaint:   Chief Complaint   Patient presents with    Check-Up    Medication Refill       Constitutional: negative  Eyes: negative  Ears, nose, mouth, throat, and face: negative  Respiratory: negative  Cardiovascular: negative  Gastrointestinal: negative  Genitourinary: negative  Integument/breast: negative  Hematologic/lymphatic: negative  Musculoskeletal: positive for knee pain and foot pain  Neurological: negative  Diabetes: Yes  Yes:  Medication compliance:  compliant most of the time  Diabetic diet compliance:  compliant most of the time  Home glucose monitoring:  is performed sporadically  Last eye exam:    Acute symptoms hyperglycemia:  none  Acute symptoms hypoglycemia:  none  POC glucose today:  mg/dL  Sugar at home this am 295    Pain Assessment:  Pain Present: yes  Pain Score: 8, but laughing and talking to me  Pain Quality/Description: Aching and Sharp    Mobility/Safety/Self-Care:  Steady Gait: No  History of Falls: No   History of a Fall within the last 30 days No  Assistive Device: Wheelchair  Poor Hygiene: No    Psychosocial:   Depression: No  If YES,    Does Patient express current thoughts of harming self or others?: No  Anxious: No  Insomnia: No  Inappropriate Behavior: No  Alcohol Abuse: no  Substance Abuse: no  Signs of Physical Abuse: No  Signs of Emotional Abuse: No    Educational:  Identify the learner who is being assessed for education:  patient                    Ability to Learn:  Exhibits ability to grasp concepts and respond to questions: Medium  Ready to Learn: Yes  calm   Preferred Method of Learning:  written  Barriers to Learning: none  Special Considerations due to cultural, Confucianism, spiritual beliefs:  No  Language:  English  :  No    Note:   Here for follow up      2:00 PM 10/26/2017

## 2017-10-31 ENCOUNTER — OFFICE VISIT (OUTPATIENT)
Dept: VASCULAR SURGERY | Age: 52
End: 2017-10-31

## 2017-10-31 VITALS
HEIGHT: 73 IN | DIASTOLIC BLOOD PRESSURE: 79 MMHG | OXYGEN SATURATION: 99 % | HEART RATE: 94 BPM | SYSTOLIC BLOOD PRESSURE: 168 MMHG | TEMPERATURE: 98.3 F

## 2017-10-31 DIAGNOSIS — I70.232 ATHEROSCLEROSIS OF NATIVE ARTERY OF RIGHT LOWER EXTREMITY WITH ULCERATION OF CALF (HCC): Primary | ICD-10-CM

## 2017-10-31 PROCEDURE — G8427 DOCREV CUR MEDS BY ELIG CLIN: HCPCS | Performed by: SURGERY

## 2017-10-31 PROCEDURE — 3017F COLORECTAL CA SCREEN DOC REV: CPT | Performed by: SURGERY

## 2017-10-31 PROCEDURE — 1111F DSCHRG MED/CURRENT MED MERGE: CPT | Performed by: SURGERY

## 2017-10-31 PROCEDURE — G8598 ASA/ANTIPLAT THER USED: HCPCS | Performed by: SURGERY

## 2017-10-31 PROCEDURE — G8419 CALC BMI OUT NRM PARAM NOF/U: HCPCS | Performed by: SURGERY

## 2017-10-31 PROCEDURE — G8484 FLU IMMUNIZE NO ADMIN: HCPCS | Performed by: SURGERY

## 2017-10-31 PROCEDURE — 99214 OFFICE O/P EST MOD 30 MIN: CPT | Performed by: SURGERY

## 2017-10-31 PROCEDURE — 4004F PT TOBACCO SCREEN RCVD TLK: CPT | Performed by: SURGERY

## 2017-10-31 NOTE — PATIENT INSTRUCTIONS
Patient Education        Peripheral Artery Angioplasty: Before Your Procedure  What is peripheral artery angioplasty? Peripheral artery angioplasty (say \"khy-APDG-sm-leana RIVERA-ter-bryce RICHARDWRA-mrp-hp-[facility]-zachery\") is a procedure to treat peripheral arterial disease of the legs. The procedure widens narrowed arteries in the pelvis or legs. It can help blood flow better. This may decrease leg pain or help wounds heal better. Your arteries can get narrowed by a substance called plaque. Plaque is a buildup of fats in your arteries. You will be awake for the procedure. You will get medicine to prevent pain and help you relax. First, your doctor will do a test to find narrowed arteries. He or she will put a tiny tube into an artery in your groin or leg. This tube is called a catheter. The doctor moves the catheter through the artery and puts a dye into it. The dye makes your arteries show up on X-ray pictures. This lets the doctor see any narrowed parts of the arteries. If your doctor finds a narrowed artery, he or she may do an angioplasty. To do this, the doctor uses a catheter with a balloon at the tip. It goes into the artery in your groin or leg. He or she moves the balloon to the narrowed area and inflates it. The balloon presses the plaque against the walls of the artery. This makes more room for blood to flow. The doctor may also put a stent in your artery. A stent is a small tube that helps keep the artery open. It can also keep small pieces of plaque from breaking off and causing problems. You may need to spend the night in the hospital. For 1 or 2 days after the procedure, you will need to take it easy at home. Follow-up care is a key part of your treatment and safety. Be sure to make and go to all appointments, and call your doctor if you are having problems. It's also a good idea to know your test results and keep a list of the medicines you take. What happens before the procedure?   Procedures can be stressful. This information will help you understand what you can expect. And it will help you safely prepare for your procedure. Preparing for the procedure  · Understand exactly what procedure is planned, along with the risks, benefits, and other options. · Tell your doctors ALL the medicines, vitamins, supplements, and herbal remedies you take. Some of these can increase the risk of bleeding or interact with anesthesia. · If you take blood thinners, such as warfarin (Coumadin), clopidogrel (Plavix), or aspirin, be sure to talk to your doctor. He or she will tell you if you should stop taking these medicines before your procedure. Make sure that you understand exactly what your doctor wants you to do. · Your doctor will tell you which medicines to take or stop before your procedure. You may need to stop taking certain medicines a week or more before the procedure. So talk to your doctor as soon as you can. · If you have an advance directive, let your doctor know. It may include a living will and a durable power of  for health care. Bring a copy to the hospital. If you don't have one, you may want to prepare one. It lets your doctor and loved ones know your health care wishes. Doctors advise that everyone prepare these papers before any type of surgery or procedure. · Tell your doctor if you are allergic to iodine or shellfish. Iodine is used in the dye that the doctor uses to find the narrowed arteries. · For several days before the procedure, do not shave your legs or groin. The skin could get irritated. This could lead to infection. What happens on the day of the procedure? · Follow the instructions exactly about when to stop eating and drinking. If you don't, your procedure may be canceled. If your doctor told you to take your medicines on the day of the procedure, take them with only a sip of water. · Take a bath or shower before you come in for your procedure.  Do not apply lotions, perfumes, talk to your doctor. He or she will tell you if and when to start taking those medicines again. Make sure that you understand exactly what your doctor wants you to do. · Be safe with medicines. Take your medicines exactly as prescribed. Call your doctor if you think you are having a problem with your medicine. · Your doctor may prescribe a blood thinner when you go home. This helps prevent blood clots. Be sure you get instructions about how to take your medicine safely. Blood thinners can cause serious bleeding problems. Care of the catheter site  · Keep a bandage over the spot where the catheter was inserted for the first day, or for as long as your doctor recommends. · Put ice or a cold pack on the area for 10 to 20 minutes at a time to help with soreness or swelling. Do this every few hours. Put a thin cloth between the ice and your skin. Follow-up care is a key part of your treatment and safety. Be sure to make and go to all appointments, and call your doctor if you are having problems. It's also a good idea to know your test results and keep a list of the medicines you take. When should you call for help? Call 911 anytime you think you may need emergency care. For example, call if:  · You passed out (lost consciousness). · You have severe trouble breathing. · You have sudden chest pain and shortness of breath, or you cough up blood. · Your groin is very swollen and you have a lump that is getting bigger under your skin where the catheter was put in. Call your doctor now or seek immediate medical care if:  · You have severe pain in your leg, or it becomes cold, pale, blue, tingly, or numb. · You are bleeding from the area where the catheter was put in your artery. · You have a fast-growing, painful lump at the catheter site. · You have signs of infection, such as:  ¨ Increased pain, swelling, warmth, or redness of the skin. ¨ Red streaks leading from the area. ¨ Pus draining from the area.   ¨ A

## 2017-10-31 NOTE — PROGRESS NOTES
Current Medications  Aspirin, Plavix, and statin    ASSESSMENT :  RLE arterial insufficiency with heel ulcer    PLAN:  He has recurrent right distal popliteal and right anterior tibial artery stenosis, which is his main runoff to the foot. I have recommended RLE arteriogram, angioplasty/stent which will be tentatively scheduled for next week. He is to continue his ASA and Plavix prior to procedure.

## 2017-11-01 ENCOUNTER — TELEPHONE (OUTPATIENT)
Dept: ENT CLINIC | Age: 52
End: 2017-11-01

## 2017-11-02 ENCOUNTER — OFFICE VISIT (OUTPATIENT)
Dept: INFECTIOUS DISEASES | Age: 52
End: 2017-11-02

## 2017-11-02 VITALS — DIASTOLIC BLOOD PRESSURE: 80 MMHG | TEMPERATURE: 98.1 F | SYSTOLIC BLOOD PRESSURE: 130 MMHG

## 2017-11-02 DIAGNOSIS — R78.81 MRSA BACTEREMIA: ICD-10-CM

## 2017-11-02 DIAGNOSIS — L02.213 ABSCESS OF CHEST WALL: ICD-10-CM

## 2017-11-02 DIAGNOSIS — B95.62 MRSA BACTEREMIA: ICD-10-CM

## 2017-11-02 DIAGNOSIS — M46.24 OSTEOMYELITIS OF THORACIC REGION (HCC): Primary | ICD-10-CM

## 2017-11-02 PROCEDURE — 3017F COLORECTAL CA SCREEN DOC REV: CPT | Performed by: INTERNAL MEDICINE

## 2017-11-02 PROCEDURE — G8598 ASA/ANTIPLAT THER USED: HCPCS | Performed by: INTERNAL MEDICINE

## 2017-11-02 PROCEDURE — 4004F PT TOBACCO SCREEN RCVD TLK: CPT | Performed by: INTERNAL MEDICINE

## 2017-11-02 PROCEDURE — G8419 CALC BMI OUT NRM PARAM NOF/U: HCPCS | Performed by: INTERNAL MEDICINE

## 2017-11-02 PROCEDURE — G8427 DOCREV CUR MEDS BY ELIG CLIN: HCPCS | Performed by: INTERNAL MEDICINE

## 2017-11-02 PROCEDURE — G8484 FLU IMMUNIZE NO ADMIN: HCPCS | Performed by: INTERNAL MEDICINE

## 2017-11-02 PROCEDURE — 99214 OFFICE O/P EST MOD 30 MIN: CPT | Performed by: INTERNAL MEDICINE

## 2017-11-02 PROCEDURE — 1111F DSCHRG MED/CURRENT MED MERGE: CPT | Performed by: INTERNAL MEDICINE

## 2017-11-02 NOTE — PROGRESS NOTES
Infectious Diseases Oupatient Follow-up Note    Primary Care Physician:  Ila Oneal MD  Last visit:   4/28/16  History Obtained From:   Patient, EPIC    CHIEF COMPLAINT / ID problem:     Chief Complaint   Patient presents with    Follow-up       HISTORY OF PRESENT ILLNESS / Interval history:      48 yo man with DM, neuropathy, hx DFI - has had mult L foot surgery, had LLE revascularization 4/6/15 (L SFA to posterior tibialis a bypass with in situ saphenous v on 4/6), L TMA 8/11/15    Admit 2/1/16 with L CP, T99.4, WBC 20k. Dx DKA, initially managed in ICU. Chest CT with L chest collection. BC + MRSA , f/u BC neg, TTE neg / refused RENETTA. CT guided drainage 2/8, culture + MRSA. Discharged 2/18/16 on iv vancomycin with L chest catheter in place. L chest catheter removed 2/24/16. IV vancomycin ended 3/3/16. Admitted 3/18/16 with R chest pain and swelling. Chest CT with fluid collection in L pectoralis m and L 1st rib. Seen by CT surg, OR 3/24, had resection of 1st rib, debridement (via transaxillary approach). Discharged on 3/28 on po linezolid 600 mg bid through 5/5/16. Hospital 6/15/16 with DKA, MRSA bacteremia, R hallux gangrene. He had recurrent L chest abscess. He had R hallux resected 6/16, had L chest debridement with rib cartilage debridement 6/27. Discharged 7/8 to [facility] on Daptomycin 700 mg daily. Hospital 7/13/17 with DKA. He had pancreatitis, pneumonia. In hospital, diarrhea and + C diff. He had Chest CT with destruction of T7 inferior / T8 superior endplate. Pt could not tolerate MRI, not diagnostic. Discharged on 7/26 to [facility]. No iv - no iv antibiotics recommended    Left [facility] on 8/27 - back at his own appt and living alone. Seen 8/30/17 - pt comes in with rolling walker. Referred from F to see me for follow-up care  Given 7 day supply of meds from ECF. Has home nursing 2x/wk.   Does not have appt with PCP or podiatry    Pt c/o R foot pain, has heel ulcer. Pt c/o LBP - 'not every day', not bad. Today 11/2/17 - pt in [facility], living in [facility]. Pt reports R wound, non-healing, seen Dr Vanessa Villegas in [facility], referred to Vascular. Had been on antibiotics. Seen by vascular (significant R anterior tibial a stenosis) and has plan for R LE angiogram with intention to treat - 11/6.     Reports NO back pain    Past Medical History:    Past Medical History:   Diagnosis Date    Clostridium difficile diarrhea 07/21/2017    PCR    Diabetes mellitus ([facility] [address]     Hyperlipidemia     Hypertension     MDRO (multiple drug resistant organisms) resistance 09/12/2017    leg    MRSA (methicillin resistant staph aureus) culture positive 06/15/2016    foot; bone; blood; chest abscess    MRSA (methicillin resistant staph aureus) culture positive 07/16/2017    foot    Osteomyelitis of left foot ([facility] [address]     Type II or unspecified type diabetes mellitus without mention of complication, not stated as uncontrolled     VRE (vancomycin resistant enterococcus) culture positive 8/25/15    foot       Past Surgical History:    Past Surgical History:   Procedure Laterality Date    ABSCESS DRAINAGE Left 06/28/2016    Dr. Alberto Srinivasan - chest wall, I&D of phlegmon, placement of wound vac    FEMORAL-TIBIAL BYPASS GRAFT Left 04/06/2015    FIRST RIB REMOVAL Left 03/24/2016    Dr. Alberto Srinivasan - via trans-axillary approach    FOOT DEBRIDEMENT Left 9/9/2015    FOOT DEBRIDEMENT Left 12/02/2015    FOOT DEBRIDEMENT Left 09/11/2015    w/delayed primary closure    FOOT SURGERY Left 4/2/2015    INCISION AND DRAINAGE LEFT FOOT WITH DEBRIDEMENT    FOOT SURGERY Left 08/11/2015    TRANSMETATARSAL OSTEOTOMY LEFT FOOT       FOOT SURGERY Left [id number]    FOOT SURGERY Left 12/07/2015    INCISION AND DRAINAGE LEFT FOOT TO BONE AND CORTEX WITH    OTHER SURGICAL HISTORY  4/10/15    LEFT SUPERFICIAL FEMORAL ARTERY TO POSTERIOR TIBIAL ARTERY IN    OTHER SURGICAL HISTORY Left 5/11/215    INCISION AND DEBRIDEMENT LEFT INNER THIGH WOUND AND LEFT CALF    OTHER SURGICAL HISTORY  5/19/2015    INCISION AND DRAINAGE WITH REMOVAL OF NECROTIC SOFT TISSUE    OTHER SURGICAL HISTORY  07/03/2016    FORMAL PARTIAL 1ST RAY AMPUTATION, INCISION AND DRAINAGE DOWN    THROMBOENDARTERECTOMY Left 9/9/2015    POPLITEAL TO TIBIAL ENDARETCTOMY; ABORTED FEM- POP BYPASS    TOE AMPUTATION  09-    left great toe amputation       Current Medications:    Current Outpatient Prescriptions   Medication Sig Dispense Refill    acetaminophen (AMINOFEN) 325 MG tablet Take 2 tablets by mouth every 6 hours as needed for Pain 120 tablet 3    gabapentin (NEURONTIN) 100 MG capsule Take 2 capsules by mouth 3 times daily 90 capsule 3    Elastic Bandages & Supports (KNEE BRACE ADJUSTABLE HINGED) MISC 1 Units by Does not apply route as needed (knee pain) 1 each 0    insulin glargine (LANTUS) 100 UNIT/ML injection vial Inject 40 Units into the skin nightly 1 vial 3    Elastic Bandages & Supports (KNEE BRACE ADJUSTABLE HINGED) MISC 1 Units by Does not apply route as needed (knee pain) 1 each 0    insulin aspart (NOVOLOG) 100 UNIT/ML injection vial Inject 5 Units into the skin 3 times daily (before meals) 1 vial 3    hydrALAZINE (APRESOLINE) 25 MG tablet Take 25 mg by mouth 3 times daily      apixaban (ELIQUIS) 5 MG TABS tablet Take 1 tablet by mouth 2 times daily 60 tablet 3    atorvastatin (LIPITOR) 80 MG tablet Take 1 tablet by mouth daily 30 tablet 3    clopidogrel (PLAVIX) 75 MG tablet Take 1 tablet by mouth daily 30 tablet 3    carvedilol (COREG) 25 MG tablet Take 1 tablet by mouth 2 times daily (with meals) 60 tablet 3    acetaminophen (AMINOFEN) 325 MG tablet Take 2 tablets by mouth every 4 hours as needed for Pain 120 tablet 3    Multiple Vitamins-Minerals (MULTIVITAMIN WITH MINERALS) tablet Take 1 tablet by mouth daily 30 tablet 3     No current facility-administered medications for this visit.

## 2017-11-06 ENCOUNTER — TELEPHONE (OUTPATIENT)
Dept: VASCULAR SURGERY | Age: 52
End: 2017-11-06

## 2017-11-07 NOTE — TELEPHONE ENCOUNTER
Fatmata lisa/ [facility] is calling regarding patient's surgery scheduled for 11/8/17. Christopher Brown would like clarification as to whether patient intends to keep his surgery appointment for tomorrow. Please advise!

## 2017-11-07 NOTE — CARE COORDINATION
message left for dr andrae arauz's asst re: note in chart stating pt could not find a ride to arrive at [facility] for procedure. Questioning whether time has been changed or procedure cancelled. regla will return call later today.

## 2017-11-07 NOTE — PRE-PROCEDURE INSTRUCTIONS
Pre procedure message left for pt. Arrival time of 0630, npo after midnight , to take his morning meds as directed by , to continue to hold eliquis, to bring a current list of meds with dosages, and to make sure he has a ride home and someone to stay the next 24 hours with him. Holding bay number left for any further questions.

## 2017-11-08 ENCOUNTER — HOSPITAL ENCOUNTER (OUTPATIENT)
Dept: CARDIAC CATH/INVASIVE PROCEDURES | Age: 52
Discharge: HOME | End: 2017-11-08
Attending: SURGERY | Admitting: SURGERY

## 2017-11-08 VITALS — WEIGHT: 190 LBS | BODY MASS INDEX: 25.18 KG/M2 | HEIGHT: 73 IN | TEMPERATURE: 98.2 F

## 2017-11-08 LAB
ANION GAP SERPL CALCULATED.3IONS-SCNC: 11 MMOL/L (ref 3–16)
BASOPHILS ABSOLUTE: 0 K/UL (ref 0–0.2)
BASOPHILS RELATIVE PERCENT: 0.4 %
BUN BLDV-MCNC: 30 MG/DL (ref 7–20)
CALCIUM SERPL-MCNC: 9.5 MG/DL (ref 8.3–10.6)
CHLORIDE BLD-SCNC: 102 MMOL/L (ref 99–110)
CO2: 27 MMOL/L (ref 21–32)
CREAT SERPL-MCNC: 1.2 MG/DL (ref 0.9–1.3)
EOSINOPHILS ABSOLUTE: 0.1 K/UL (ref 0–0.6)
EOSINOPHILS RELATIVE PERCENT: 2 %
GFR AFRICAN AMERICAN: >60
GFR NON-AFRICAN AMERICAN: >60
GLUCOSE BLD-MCNC: 412 MG/DL (ref 70–99)
HCT VFR BLD CALC: 33.7 % (ref 40.5–52.5)
HEMOGLOBIN: 11.3 G/DL (ref 13.5–17.5)
INR BLD: 1.21 (ref 0.85–1.15)
LYMPHOCYTES ABSOLUTE: 1.5 K/UL (ref 1–5.1)
LYMPHOCYTES RELATIVE PERCENT: 20.8 %
MCH RBC QN AUTO: 30.1 PG (ref 26–34)
MCHC RBC AUTO-ENTMCNC: 33.5 G/DL (ref 31–36)
MCV RBC AUTO: 89.6 FL (ref 80–100)
MONOCYTES ABSOLUTE: 0.5 K/UL (ref 0–1.3)
MONOCYTES RELATIVE PERCENT: 7.4 %
NEUTROPHILS ABSOLUTE: 5 K/UL (ref 1.7–7.7)
NEUTROPHILS RELATIVE PERCENT: 69.4 %
PDW BLD-RTO: 14.3 % (ref 12.4–15.4)
PLATELET # BLD: 222 K/UL (ref 135–450)
PMV BLD AUTO: 9 FL (ref 5–10.5)
POTASSIUM SERPL-SCNC: 4.5 MMOL/L (ref 3.5–5.1)
PROTHROMBIN TIME: 13.7 SEC (ref 9.6–13)
RBC # BLD: 3.76 M/UL (ref 4.2–5.9)
SODIUM BLD-SCNC: 140 MMOL/L (ref 136–145)
WBC # BLD: 7.3 K/UL (ref 4–11)

## 2017-11-08 ASSESSMENT — PAIN SCALES - GENERAL: PAINLEVEL_OUTOF10: 8

## 2017-11-08 ASSESSMENT — PAIN DESCRIPTION - ORIENTATION: ORIENTATION: RIGHT

## 2017-11-08 ASSESSMENT — PAIN DESCRIPTION - LOCATION: LOCATION: FOOT

## 2017-11-13 ENCOUNTER — TELEPHONE (OUTPATIENT)
Dept: SURGERY | Age: 52
End: 2017-11-13

## 2017-11-13 ENCOUNTER — TELEPHONE (OUTPATIENT)
Dept: ENT CLINIC | Age: 52
End: 2017-11-13

## 2017-11-14 NOTE — PRE-PROCEDURE INSTRUCTIONS
Pre procedure instructions left on voice mail; arrival time of 0630, npo after midnight tonight, to take morning meds as directed by , bring a current list of meds with dosages and have someone available to drive him home and stay the next 24 hours. [facility] phone number left for any further questions.

## 2017-11-15 ENCOUNTER — HOSPITAL ENCOUNTER (OUTPATIENT)
Dept: CARDIAC CATH/INVASIVE PROCEDURES | Age: 52
Discharge: HOME | End: 2017-11-15
Admitting: SURGERY

## 2017-11-21 ENCOUNTER — HOSPITAL ENCOUNTER (OUTPATIENT)
Dept: WOUND CARE | Age: 52
Discharge: HOME | End: 2017-11-21
Attending: PODIATRIST | Admitting: PODIATRIST

## 2017-11-21 VITALS
SYSTOLIC BLOOD PRESSURE: 162 MMHG | TEMPERATURE: 97.9 F | HEART RATE: 86 BPM | RESPIRATION RATE: 18 BRPM | DIASTOLIC BLOOD PRESSURE: 72 MMHG

## 2017-11-21 LAB
GLUCOSE BLD-MCNC: 595 MG/DL (ref 70–99)
GLUCOSE BLD-MCNC: >600 MG/DL (ref 70–99)
PERFORMED ON: ABNORMAL

## 2017-11-21 RX ORDER — LIDOCAINE HYDROCHLORIDE 40 MG/ML
SOLUTION TOPICAL ONCE
Status: COMPLETED | OUTPATIENT
Start: 2017-11-21 | End: 2017-11-21

## 2017-11-21 RX ADMIN — LIDOCAINE HYDROCHLORIDE: 40 SOLUTION TOPICAL at 13:43

## 2017-11-21 ASSESSMENT — PAIN SCALES - GENERAL: PAINLEVEL_OUTOF10: 8

## 2017-11-21 ASSESSMENT — PAIN DESCRIPTION - DESCRIPTORS: DESCRIPTORS: SHARP;SHOOTING

## 2017-11-21 ASSESSMENT — PAIN DESCRIPTION - LOCATION: LOCATION: FOOT

## 2017-11-21 ASSESSMENT — PAIN DESCRIPTION - ORIENTATION: ORIENTATION: RIGHT

## 2017-11-21 ASSESSMENT — PAIN DESCRIPTION - PAIN TYPE: TYPE: ACUTE PAIN

## 2017-11-21 NOTE — PROGRESS NOTES
[facility]  Progress Note and Procedure Note      Diego Alvares  MEDICAL RECORD NUMBER:  [id number]  AGE: 47 y.o. GENDER: male  : 1965  EPISODE DATE:  2017    Subjective:       Chief Complaint   Patient presents with    Wound Check     f/u right dorsal foot & heel         HISTORY of PRESENT ILLNESS HPI     Diego Avlares is a 47 y.o. male who presents today for wound evaluation. History of Wound Context:  RIGHT posterior heel wound diabetic neuropathic. Patient presents today with a blood glucose that reads \"high\" here in clinic. He relates that he ate pizza last night and has not taken any medication/insulin today. He had an angiogram with intervention last week per Dr. Nj Gabriel.   Wound Pain Timing/Severity: none  Quality of pain: N/A  Severity:  0 / 10   Modifying Factors: None  Associated Signs/Symptoms: drainage and numbness    Ulcer Identification:  Ulcer Type: diabetic and neuropathic  Contributing Factors: edema, diabetes, poor glucose control, shear force and arterial insufficiency          PAST MEDICAL HISTORY        Diagnosis Date    Clostridium difficile diarrhea 2017    PCR    Diabetes mellitus ([address]     Hyperlipidemia     Hypertension     MDRO (multiple drug resistant organisms) resistance 2017    leg    MRSA (methicillin resistant staph aureus) culture positive 06/15/2016    foot; bone; blood; chest abscess    MRSA (methicillin resistant staph aureus) culture positive 2017    foot    Osteomyelitis of left foot ([address]     Type II or unspecified type diabetes mellitus without mention of complication, not stated as uncontrolled     VRE (vancomycin resistant enterococcus) culture positive 8/25/15    foot       PAST SURGICAL HISTORY    Past Surgical History:   Procedure Laterality Date    ABSCESS DRAINAGE Left 2016    Dr. Tobin Oar - chest wall, I&D of phlegmon, placement of wound vac    FEMORAL-TIBIAL BYPASS GRAFT Left (LANTUS) 100 UNIT/ML injection vial Inject 35 Units into the skin nightly 1 vial 3    aspirin 81 MG tablet Take 81 mg by mouth daily      gabapentin (NEURONTIN) 100 MG capsule Take 2 capsules by mouth 3 times daily 90 capsule 3    insulin aspart (NOVOLOG) 100 UNIT/ML injection vial Inject 5 Units into the skin 3 times daily (before meals) 1 vial 3    hydrALAZINE (APRESOLINE) 25 MG tablet Take 25 mg by mouth 3 times daily      apixaban (ELIQUIS) 5 MG TABS tablet Take 1 tablet by mouth 2 times daily 60 tablet 3    atorvastatin (LIPITOR) 80 MG tablet Take 1 tablet by mouth daily 30 tablet 3    carvedilol (COREG) 25 MG tablet Take 1 tablet by mouth 2 times daily (with meals) 60 tablet 3    Multiple Vitamins-Minerals (MULTIVITAMIN WITH MINERALS) tablet Take 1 tablet by mouth daily 30 tablet 3    docusate sodium (COLACE) 100 MG capsule Take 1 capsule by mouth 2 times daily for 7 days Please take while taking narcotic pain medicine. If you develop loose or watery stools, then stop taking. 14 capsule 0    acetaminophen (AMINOFEN) 325 MG tablet Take 2 tablets by mouth every 6 hours as needed for Pain 120 tablet 3    Elastic Bandages & Supports (KNEE BRACE ADJUSTABLE HINGED) MISC 1 Units by Does not apply route as needed (knee pain) 1 each 0    Elastic Bandages & Supports (KNEE BRACE ADJUSTABLE HINGED) MISC 1 Units by Does not apply route as needed (knee pain) 1 each 0    clopidogrel (PLAVIX) 75 MG tablet Take 1 tablet by mouth daily 30 tablet 3    acetaminophen (AMINOFEN) 325 MG tablet Take 2 tablets by mouth every 4 hours as needed for Pain 120 tablet 3     No current facility-administered medications on file prior to encounter. REVIEW OF SYSTEMS    Pertinent items are noted in HPI. Review of Systems: A 12 point review of symptoms is unremarkable with the exception of the chief complaint.   Patient specifically denies nausea, fever, vomiting, chills, shortness of breath, chest pain, abdominal pain, Wound dehiscence    Post-op pain    Osteomyelitis of left foot (HCC)    Surgical wound dehiscence    VRE infection (vancomycin resistant Enterococcus)    Pseudomonas infection    Type 2 diabetes mellitus with diabetic polyneuropathy (HCC)    Type 1 diabetes mellitus with diabetic peripheral angiopathy with gangrene (HCC)    Diabetic foot ulcer with osteomyelitis (HCC)    Atherosclerosis of native artery of left lower extremity with ulceration of calf (HCC)    Failure of artificial skin graft    Pain management contract agreement    Diabetic ulcer of left foot associated with type 2 diabetes mellitus ([address]    Atherosclerosis of native artery of right lower extremity with ulceration of calf (HCC)    Sepsis ([address]    Left-sided chest wall pain    Essential hypertension    Pure hypercholesterolemia    Chronic osteomyelitis of left foot (HCC)    Elevated troponin    Transaminitis    Pleural effusion    Chest wall abscess    Pneumonia of both lower lobes due to infectious organism    Abscess of chest wall    Osteomyelitis of thoracic region ([address]    Pyogenic inflammation of bone (HCC)    Toe gangrene ([address]    Diabetic ketoacidosis without coma associated with type 2 diabetes mellitus ([address]    Diabetic ketoacidosis with coma associated with type 1 diabetes mellitus (HCC)    Somnolence    Acute right-sided thoracic back pain    Depression    Encounter for palliative care    Dysphagia    Acute hypoxemic respiratory failure (HCC)    Acute septic pulmonary embolism without acute cor pulmonale (HCC)    MRSA bacteremia    Pain    Encephalopathy acute    Constipation    Acute biliary pancreatitis without infection or necrosis    Fever    Idiopathic acute pancreatitis without infection or necrosis    Chronic skin ulcer, limited to breakdown of skin (HCC)         Excisional Debridement Procedure Note  Indications:  Based on my examination of this patient's wound(s)/ulcer(s) today, take him to the ED to be evaluated for his hyperglycemia. Discharge [facility] Physician Orders and Discharge Instructions  [id number] [address] [place] [address] [place] [address]  Telephone: [phone]    NAME:  Nurys Lerma  YOB: 1965  MEDICAL RECORD NUMBER:  [id number]  DATE:  11/21/2017    Wash hands with soap and water prior to and after every dressing change. Wound Cleansing:   · Do not scrub or use excessive force. · With each dressing change, rinse wounds with 0.9% Saline. (May use wound wash or soft contact solution. Both can be purchased at a local drug store). · If unable to obtain saline, may use a gentle soap and water. · Keep wounds dry in the shower unless otherwise instructed by the physician. Topical Treatments:  Do not apply lotions, creams, or ointments to wound bed unless directed. [x] Apply moisturizing lotion to skin surrounding the wound prior to dressing change.  [] Apply antifungal ointment to skin surrounding the wound prior to dressing change.  [] Apply thin film of moisture barrier ointment to skin immediately around wound. [] Other:      Dressings:           Wound Location: Right Heel     [x] Apply Primary Dressing to wound:       [] Foam/Foam with Border  [] Mepitel/Non-adherent/Xeroform   [] Alginate with Silver    [] Alginate   [] Collagen [] Collagen with Silver     [] Hydrocolloid  [] Hydrafera Blue moistened with saline   [] MediHoney Paste/Gel [] Hydrogel      [x] Santyl with Moisten saline gauze    [] Bactroban/Mupirocin [] Polysporin  [] Other:      Pack wound loosely with  [] Iodoform   [] Plain Packing  [] Other      [x] Cover and Secure with:     [x] Gauze [] ABD [] Stretch bandage roll [] Kerlix   [] Coban [] Ace Wrap [] Cover Roll Tape    [] Other:    Avoid contact of tape with skin.     [x] Change dressing: [x] Daily    [] Every Other Day [] Three times per Sravan   [] Dr Mauricio Chinchilla  [] Dr Kameron Roa       Physician Signature:__________________________________        The information contained in the After Visit Summary has been reviewed with me, the patient and/or responsible adult, by my health care provider(s). I had the opportunity to ask questions regarding this information.   I have elected to receive;      [] Patient unable to sign Discharge Instructions given to ECF/Transportation/POA      []  After Visit Summary  []  Comprehensive Discharge Instruction              Electronically signed by Arturo Leslie DPM on 11/21/2017 at 2:44 PM

## 2017-11-28 ENCOUNTER — HOSPITAL ENCOUNTER (OUTPATIENT)
Dept: WOUND CARE | Age: 52
Discharge: HOME | End: 2017-11-28
Attending: PODIATRIST | Admitting: PODIATRIST

## 2017-11-28 VITALS
HEART RATE: 99 BPM | SYSTOLIC BLOOD PRESSURE: 125 MMHG | DIASTOLIC BLOOD PRESSURE: 76 MMHG | TEMPERATURE: 98.3 F | RESPIRATION RATE: 18 BRPM

## 2017-11-28 LAB
GLUCOSE BLD-MCNC: 106 MG/DL (ref 70–99)
PERFORMED ON: ABNORMAL

## 2017-11-28 RX ORDER — LIDOCAINE HYDROCHLORIDE 40 MG/ML
2.5 SOLUTION TOPICAL ONCE
Status: COMPLETED | OUTPATIENT
Start: 2017-11-28 | End: 2017-11-28

## 2017-11-28 RX ADMIN — LIDOCAINE HYDROCHLORIDE 2.5 ML: 40 SOLUTION TOPICAL at 15:33

## 2017-11-28 NOTE — PROGRESS NOTES
[address] [facility]  Progress Note and Procedure Note      Morena Brandon  MEDICAL RECORD NUMBER:  [id number]  AGE: 47 y.o. GENDER: male  : 1965  EPISODE DATE:  2017    Subjective:       Chief Complaint   Patient presents with    Wound Check     right foot         HISTORY of PRESENT ILLNESS HPI     Morena Brandon is a 47 y.o. male who presents today for wound evaluation. History of Wound Context:  RIGHT posterior heel wound diabetic neuropathic. He had an angiogram with intervention per Dr. Danika Jackson.    Wound Pain Timing/Severity: none  Quality of pain: N/A  Severity:  0 / 10   Modifying Factors: None  Associated Signs/Symptoms: drainage and numbness    Ulcer Identification:  Ulcer Type: diabetic and neuropathic  Contributing Factors: edema, diabetes, poor glucose control, shear force and arterial insufficiency          PAST MEDICAL HISTORY        Diagnosis Date    Clostridium difficile diarrhea 2017    PCR    Diabetes mellitus [facility]     Hyperlipidemia     Hypertension     MDRO (multiple drug resistant organisms) resistance 2017    leg    MRSA (methicillin resistant staph aureus) culture positive 06/15/2016    foot; bone; blood; chest abscess    MRSA (methicillin resistant staph aureus) culture positive 2017    foot    Osteomyelitis of left foot ([facility]     Type II or unspecified type diabetes mellitus without mention of complication, not stated as uncontrolled     VRE (vancomycin resistant enterococcus) culture positive 8/25/15    foot       PAST SURGICAL HISTORY    Past Surgical History:   Procedure Laterality Date    ABSCESS DRAINAGE Left 2016    Dr. Candy Felipe - chest wall, I&D of phlegmon, placement of wound vac    FEMORAL-TIBIAL BYPASS GRAFT Left 2015    FIRST RIB REMOVAL Left 2016    Dr. Candy Felipe - via trans-axillary approach    FOOT DEBRIDEMENT Left 2015    FOOT DEBRIDEMENT Left 2015    FOOT DEBRIDEMENT Left 2015 w/delayed primary closure    FOOT SURGERY Left 4/2/2015    INCISION AND DRAINAGE LEFT FOOT WITH DEBRIDEMENT    FOOT SURGERY Left 08/11/2015    TRANSMETATARSAL OSTEOTOMY LEFT FOOT       FOOT SURGERY Left [id number]    FOOT SURGERY Left 12/07/2015    INCISION AND DRAINAGE LEFT FOOT TO BONE AND CORTEX WITH    OTHER SURGICAL HISTORY  4/10/15    LEFT SUPERFICIAL FEMORAL ARTERY TO POSTERIOR TIBIAL ARTERY IN    OTHER SURGICAL HISTORY Left 5/11/215    INCISION AND DEBRIDEMENT LEFT INNER THIGH WOUND AND LEFT CALF    OTHER SURGICAL HISTORY  5/19/2015    INCISION AND DRAINAGE WITH REMOVAL OF NECROTIC SOFT TISSUE    OTHER SURGICAL HISTORY  07/03/2016    FORMAL PARTIAL 1ST RAY AMPUTATION, INCISION AND DRAINAGE DOWN    THROMBOENDARTERECTOMY Left 9/9/2015    POPLITEAL TO TIBIAL ENDARETCTOMY; ABORTED FEM- POP BYPASS    TOE AMPUTATION  09-    left great toe amputation       FAMILY HISTORY    Family History   Problem Relation Age of Onset    High Blood Pressure Mother     Diabetes Mother     Diabetes Sister     Diabetes Brother        SOCIAL HISTORY    Social History   Substance Use Topics    Smoking status: Current Some Day Smoker     Types: Cigars     Last attempt to quit: 12/21/2015    Smokeless tobacco: Never Used      Comment: only smokes cigars a couple times a week    Alcohol use No      Comment: rare       ALLERGIES    Allergies   Allergen Reactions    Toradol [Ketorolac Tromethamine] Nausea And Vomiting     Nausea/emesis, near syncopal past 2x       MEDICATIONS    Current Outpatient Prescriptions on File Prior to Encounter   Medication Sig Dispense Refill    insulin glargine (LANTUS) 100 UNIT/ML injection vial Inject 35 Units into the skin nightly 1 vial 3    aspirin 81 MG tablet Take 81 mg by mouth daily      gabapentin (NEURONTIN) 100 MG capsule Take 2 capsules by mouth 3 times daily 90 capsule 3    insulin aspart (NOVOLOG) 100 UNIT/ML injection vial Inject 5 Units into the skin 3 times daily (before meals) 1 vial 3    hydrALAZINE (APRESOLINE) 25 MG tablet Take 25 mg by mouth 3 times daily      apixaban (ELIQUIS) 5 MG TABS tablet Take 1 tablet by mouth 2 times daily 60 tablet 3    atorvastatin (LIPITOR) 80 MG tablet Take 1 tablet by mouth daily 30 tablet 3    clopidogrel (PLAVIX) 75 MG tablet Take 1 tablet by mouth daily 30 tablet 3    carvedilol (COREG) 25 MG tablet Take 1 tablet by mouth 2 times daily (with meals) 60 tablet 3    acetaminophen (AMINOFEN) 325 MG tablet Take 2 tablets by mouth every 4 hours as needed for Pain 120 tablet 3    Multiple Vitamins-Minerals (MULTIVITAMIN WITH MINERALS) tablet Take 1 tablet by mouth daily 30 tablet 3    acetaminophen (AMINOFEN) 325 MG tablet Take 2 tablets by mouth every 6 hours as needed for Pain 120 tablet 3    Elastic Bandages & Supports (KNEE BRACE ADJUSTABLE HINGED) MISC 1 Units by Does not apply route as needed (knee pain) 1 each 0    Elastic Bandages & Supports (KNEE BRACE ADJUSTABLE HINGED) MISC 1 Units by Does not apply route as needed (knee pain) 1 each 0     No current facility-administered medications on file prior to encounter. REVIEW OF SYSTEMS    Pertinent items are noted in HPI. Review of Systems: A 12 point review of symptoms is unremarkable with the exception of the chief complaint. Patient specifically denies nausea, fever, vomiting, chills, shortness of breath, chest pain, abdominal pain, constipation or difficulty urinating. Objective:      /76   Pulse 99   Temp 98.3 °F (36.8 °C) (Oral)   Resp 18     Wt Readings from Last 3 Encounters:   11/21/17 195 lb (88.5 kg)   11/16/17 153 lb 10.6 oz (69.7 kg)   11/08/17 190 lb (86.2 kg)       PHYSICAL EXAM    Patient presents with a sneaker on his right foot and is not wearing his heel suspension bootie.   General Appearance: alert and oriented to person, place and time, well-developed and well-nourished, in no acute distress  Pitting edema noted on the bilateral lower extremities. TMA noted on the left lower extremity with no open wounds noted. Wound noted on the posterior aspect of the right lower extremity. Wound has fibrotic and nonviable tissue that extends down through and includes the subcutaneous tissue/muscle/fascia. After debridement the wound has a fibrous base. There is  surrounding erythema and warmth noted and there is persistent malodor noted. The wound does not probe or track to bone.         Assessment:     Patient Active Problem List   Diagnosis    Osteomyelitis ([address]    Dyslipidemia    Carotid bruit present    S/P amputation    Abscess of third toe of left foot    Diabetic neuropathy (HCC)    [facility] foot [facility]    Onychomycosis    Pain of right heel    PVD (peripheral vascular disease) ([address]    Abscess of foot    Acute renal failure (ARF) ([address]    Dehydration with hyponatremia    Diabetic infection of right foot ([address]    TUTU (acute kidney injury) ([address]    Uncontrolled type 1 diabetes mellitus ([address]    Hypertension    Non compliance w medication regimen    PAD (peripheral artery disease) ([address]    History of osteomyelitis    Anemia    PAD [facility]peripheral artery disease) ([facility])    Foot ulcer ([address]    Diabetic foot ulcer ([address]    Atherosclerosis of native arteries of the extremities with ulceration ([address]    Foot osteomyelitis ([address]    Edema of lower extremity    Wound infection    Wound dehiscence    Post-op pain    Osteomyelitis of left foot ([address]    Surgical wound dehiscence    VRE infection (vancomycin resistant Enterococcus)    Pseudomonas infection    Type 2 diabetes mellitus with diabetic polyneuropathy (HCC)    Type 1 diabetes mellitus with diabetic peripheral angiopathy with gangrene ([address]    Diabetic foot ulcer with osteomyelitis ([address]    Atherosclerosis of native artery of left lower extremity with ulceration of calf ([facility])    Failure of artificial skin graft    Pain management contract agreement    tolerated by patient. Plan:   Discussed with patient at length that although his circulation is improved since his angiogram he still has single vessel runoff to his foot. If he limb is to be salvaged he MUST be compliant with managing his DM. He relates that he understands and will be more compliant with taking his medication and following his DM diet. Off loading RIGHT heel with mostly fibrotic appearance. Patient was encouraged to maintain non-weight bearing. He was encouraged to use his heel suspension booties to offload the area at all times while not ambulating. Continue health care for daily dressing changes with Santyl. Treatment Note please see attached Discharge Instructions    In my professional opinion this patient would benefit from HBO Therapy: Undecided       Patient is to return to wound care center in:  1 week(s)    Written patient dismissal instructions given to patient and signed by patient or POA. Discharge [address] [facility] Physician Orders and Discharge Instructions  Mike Kirby [id number] [address] [place] [address]  Telephone: [phone]    NAME:  Christie Mckeon  YOB: 1965  MEDICAL RECORD NUMBER:  [id number]  DATE:  11/28/2017    Wash hands with soap and water prior to and after every dressing change. Wound Cleansing:   · Do not scrub or use excessive force. · With each dressing change, rinse wounds with 0.9% Saline. (May use wound wash or soft contact solution. Both can be purchased at a local drug store). · If unable to obtain saline, may use a gentle soap and water. · Keep wounds dry in the shower unless otherwise instructed by the physician. Topical Treatments:  Do not apply lotions, creams, or ointments to wound bed unless directed.      [x] Apply moisturizing lotion to skin surrounding the wound prior to dressing change.  [] Apply antifungal ointment to skin surrounding the wound prior to dressing change.  [] Apply thin film of moisture barrier ointment to skin immediately around wound. [] Other:      Dressings:           Wound Location: Right Heel   [x] Apply Primary Dressing to wound:       [] Foam/Foam with Border  [] Mepitel/Non-adherent/Xeroform   [] Alginate with Silver    [] Alginate   [] Collagen [] Collagen with Silver     [] Hydrocolloid  [] Hydrafera Blue moistened with saline   [] MediHoney Paste/Gel [] Hydrogel      [x] Santyl with Moisten saline gauze    [] Bactroban/Mupirocin [] Polysporin  [] Other:      Pack wound loosely with  [] Iodoform   [] Plain Packing  [] Other      [x] Cover and Secure with:     [x] Gauze [] ABD [] Stretch bandage roll [x] Kerlix   [] Coban [] Ace Wrap [] Cover Roll Tape    [] Other:    Avoid contact of tape with skin. [x] Change dressing: [x] Daily    [] Every Other Day [] Three times per week   [] Once a week [] Do Not Change Dressing   [] Other:                           Edema Control:  Apply: [] Compression Stocking []Right Leg []Left Leg     [] SpandaGrip []Right Leg  []Left Leg      []Low compression 5-10 mm/Hg      []Medium compression 10-20 mm/Hg     []High compression  20-30 mm/Hg    Apply every morning immediately when getting up. They should be applied to affected leg(s) from mid foot to knee making sure to cover the heel. Remove every night before going to bed. [x] Elevate leg(s) above the level of the heart for 30 minutes 4-5 times a day and/or when sitting. [x] Avoid prolonged standing in one place.             Off-Loading:   [] Off-loading when: [] walking  [] in bed [] sitting    [] Total non-weight bearing:  [] Right Leg  [] Left Leg     [] Partial weight bearing:   [] Right Leg  [] Left Leg     [] Assistive Device: [] Walker [] Crutches  [] Wheelchair [] Roll About   [x] Surgical shoe/Darco    [] Podus Boot(s)   [x] Prevalon Boot(s)  To bilat heels in bed [] CROW Boot    [] Cast/CAM Boot [] Comprehensive Discharge Instruction              Electronically signed by Lamont Ch DPM on 11/28/2017 at 3:57 PM

## 2017-12-12 ENCOUNTER — HOSPITAL ENCOUNTER (OUTPATIENT)
Dept: WOUND CARE | Age: 52
Discharge: HOME | End: 2017-12-12
Admitting: PODIATRIST

## 2017-12-12 VITALS
RESPIRATION RATE: 18 BRPM | DIASTOLIC BLOOD PRESSURE: 77 MMHG | SYSTOLIC BLOOD PRESSURE: 168 MMHG | TEMPERATURE: 97.6 F | HEART RATE: 92 BPM

## 2017-12-12 DIAGNOSIS — E11.628 DIABETIC INFECTION OF RIGHT FOOT (HCC): Primary | ICD-10-CM

## 2017-12-12 DIAGNOSIS — L08.9 DIABETIC INFECTION OF RIGHT FOOT (HCC): Primary | ICD-10-CM

## 2017-12-12 DIAGNOSIS — E08.621 DIABETIC ULCER OF MIDFOOT ASSOCIATED WITH DIABETES MELLITUS DUE TO UNDERLYING CONDITION, WITH FAT LAYER EXPOSED, UNSPECIFIED LATERALITY (HCC): ICD-10-CM

## 2017-12-12 DIAGNOSIS — L97.402 DIABETIC ULCER OF MIDFOOT ASSOCIATED WITH DIABETES MELLITUS DUE TO UNDERLYING CONDITION, WITH FAT LAYER EXPOSED, UNSPECIFIED LATERALITY (HCC): ICD-10-CM

## 2017-12-12 LAB
GLUCOSE BLD-MCNC: 176 MG/DL (ref 70–99)
PERFORMED ON: ABNORMAL

## 2017-12-12 RX ORDER — LIDOCAINE HYDROCHLORIDE 40 MG/ML
2.5 SOLUTION TOPICAL ONCE
Status: COMPLETED | OUTPATIENT
Start: 2017-12-12 | End: 2017-12-12

## 2017-12-12 RX ADMIN — LIDOCAINE HYDROCHLORIDE 2.5 ML: 40 SOLUTION TOPICAL at 13:31

## 2017-12-12 ASSESSMENT — PAIN DESCRIPTION - PAIN TYPE: TYPE: ACUTE PAIN

## 2017-12-12 ASSESSMENT — PAIN SCALES - GENERAL: PAINLEVEL_OUTOF10: 8

## 2017-12-12 ASSESSMENT — PAIN DESCRIPTION - LOCATION: LOCATION: FOOT

## 2017-12-12 ASSESSMENT — PAIN DESCRIPTION - ORIENTATION: ORIENTATION: RIGHT

## 2017-12-12 ASSESSMENT — PAIN DESCRIPTION - DESCRIPTORS: DESCRIPTORS: SHARP

## 2017-12-12 NOTE — PROGRESS NOTES
Orders faxed to [facility] DEACONESS:  Phone: [phone]  Fax: [phone]

## 2017-12-19 ENCOUNTER — HOSPITAL ENCOUNTER (OUTPATIENT)
Dept: WOUND CARE | Age: 52
Discharge: HOME | End: 2017-12-19
Attending: PODIATRIST | Admitting: PODIATRIST

## 2017-12-19 VITALS
HEART RATE: 88 BPM | SYSTOLIC BLOOD PRESSURE: 111 MMHG | DIASTOLIC BLOOD PRESSURE: 65 MMHG | RESPIRATION RATE: 18 BRPM | TEMPERATURE: 98.2 F

## 2017-12-19 LAB
GLUCOSE BLD-MCNC: 242 MG/DL (ref 70–99)
PERFORMED ON: ABNORMAL

## 2017-12-19 RX ORDER — LIDOCAINE HYDROCHLORIDE 40 MG/ML
2.5 SOLUTION TOPICAL ONCE
Status: DISCONTINUED | OUTPATIENT
Start: 2017-12-19 | End: 2017-12-20 | Stop reason: HOSPADM

## 2017-12-19 ASSESSMENT — PAIN DESCRIPTION - FREQUENCY: FREQUENCY: INTERMITTENT

## 2017-12-19 ASSESSMENT — PAIN DESCRIPTION - PROGRESSION: CLINICAL_PROGRESSION: NOT CHANGED

## 2017-12-19 ASSESSMENT — PAIN DESCRIPTION - LOCATION: LOCATION: FOOT

## 2017-12-19 ASSESSMENT — PAIN DESCRIPTION - ONSET: ONSET: ON-GOING

## 2017-12-19 ASSESSMENT — PAIN DESCRIPTION - ORIENTATION: ORIENTATION: RIGHT

## 2017-12-19 ASSESSMENT — PAIN DESCRIPTION - DESCRIPTORS: DESCRIPTORS: SHARP

## 2017-12-19 ASSESSMENT — PAIN DESCRIPTION - PAIN TYPE: TYPE: ACUTE PAIN

## 2017-12-19 ASSESSMENT — PAIN SCALES - GENERAL: PAINLEVEL_OUTOF10: 6

## 2017-12-19 NOTE — PROGRESS NOTES
PuraPly AMTreatment Note    NAME:  Durga Padilla  YOB: 1965  MEDICAL RECORD NUMBER:  [id number]  DATE:  12/19/2017    Goal:  Patient will receive safe and proper application of skin substitute. Patient will comply with caring for dressing, and reporting complications. [x]Expiration date checked immediately prior to use. [x] Package intact prior to use and no damage noted. [x] Transport temperature controlled and acceptable. PuraPly was removed from protective sterile packaging by physician and            applied to prepared ulcer bed. PuraPly was hydrated with sterile normal saline per physician. PuraPly was applied to Right Heel and affixed with steri-strips by the physician. [x] PuraPly was covered with non-adherent ulcer dressing. [x] Applied gauze over non-adherent. [x] Applied dry gauze and/or roll gauze. Patient/caregiver was instructed not to remove dressing and to keep it clean    and dry. Pt/family/caregiver was instructed on signs and symptoms of complications       to report such as draining through dressing, dressing falling down/slipping,           getting wet, or severe pain or tingling. PuraPly AM may be applied a total of 10 times per wound over a 12 month period. Date of first application of PuraPly for this current wound is December 19, 2017 .     Guidelines followed    Electronically signed by Frances Kerns RN on 12/19/2017 at 3:24 PM

## 2017-12-21 NOTE — PROGRESS NOTES
[address] [facility]  Progress Note and Procedure Note      Libertad Moise  MEDICAL RECORD NUMBER:  [id number]  AGE: 47 y.o. GENDER: male  : 1965  EPISODE DATE:  2017    Subjective:       Chief Complaint   Patient presents with    Wound Check     right heel         HISTORY of PRESENT ILLNESS HPI     Libertad Moise is a 47 y.o. male who presents today for wound evaluation. History of Wound Context:  RIGHT posterior heel wound diabetic neuropathic. He had an angiogram with intervention per Dr. Graeme Flores.    Wound Pain Timing/Severity: none  Quality of pain: N/A  Severity:  0 / 10   Modifying Factors: None  Associated Signs/Symptoms: drainage and numbness    Ulcer Identification:  Ulcer Type: diabetic and neuropathic  Contributing Factors: edema, diabetes, poor glucose control, shear force and arterial insufficiency          PAST MEDICAL HISTORY        Diagnosis Date    Clostridium difficile diarrhea 2017    PCR    Diabetes mellitus ([facility]     Hyperlipidemia     Hypertension     MDRO (multiple drug resistant organisms) resistance 2017    leg    MRSA (methicillin resistant staph aureus) culture positive 06/15/2016    foot; bone; blood; chest abscess    MRSA (methicillin resistant staph aureus) culture positive 2017    foot    Osteomyelitis of left foot ([facility]     Type II or unspecified type diabetes mellitus without mention of complication, not stated as uncontrolled     VRE (vancomycin resistant enterococcus) culture positive 8/25/15    foot       PAST SURGICAL HISTORY    Past Surgical History:   Procedure Laterality Date    ABSCESS DRAINAGE Left 2016    Dr. Abhi Dominguez - chest wall, I&D of phlegmon, placement of wound vac    FEMORAL-TIBIAL BYPASS GRAFT Left 2015    FIRST RIB REMOVAL Left 2016    Dr. Abhi Dominguez - via trans-axillary approach    FOOT DEBRIDEMENT Left 2015    FOOT DEBRIDEMENT Left 2015    FOOT DEBRIDEMENT Left 2015 w/delayed primary closure    FOOT SURGERY Left 4/2/2015    INCISION AND DRAINAGE LEFT FOOT WITH DEBRIDEMENT    FOOT SURGERY Left 08/11/2015    TRANSMETATARSAL OSTEOTOMY LEFT FOOT       FOOT SURGERY Left [id number]    FOOT SURGERY Left 12/07/2015    INCISION AND DRAINAGE LEFT FOOT TO BONE AND CORTEX WITH    OTHER SURGICAL HISTORY  4/10/15    LEFT SUPERFICIAL FEMORAL ARTERY TO POSTERIOR TIBIAL ARTERY IN    OTHER SURGICAL HISTORY Left 5/11/215    INCISION AND DEBRIDEMENT LEFT INNER THIGH WOUND AND LEFT CALF    OTHER SURGICAL HISTORY  5/19/2015    INCISION AND DRAINAGE WITH REMOVAL OF NECROTIC SOFT TISSUE    OTHER SURGICAL HISTORY  07/03/2016    FORMAL PARTIAL 1ST RAY AMPUTATION, INCISION AND DRAINAGE DOWN    THROMBOENDARTERECTOMY Left 9/9/2015    POPLITEAL TO TIBIAL ENDARETCTOMY; ABORTED FEM- POP BYPASS    TOE AMPUTATION  09-    left great toe amputation       FAMILY HISTORY    Family History   Problem Relation Age of Onset    High Blood Pressure Mother     Diabetes Mother     Diabetes Sister     Diabetes Brother        SOCIAL HISTORY    Social History   Substance Use Topics    Smoking status: Current Some Day Smoker     Types: Cigars     Last attempt to quit: 12/21/2015    Smokeless tobacco: Never Used      Comment: only smokes cigars a couple times a week    Alcohol use No      Comment: rare       ALLERGIES    Allergies   Allergen Reactions    Toradol [Ketorolac Tromethamine] Nausea And Vomiting     Nausea/emesis, near syncopal past 2x       MEDICATIONS    Current Outpatient Prescriptions on File Prior to Encounter   Medication Sig Dispense Refill    insulin glargine (LANTUS) 100 UNIT/ML injection vial Inject 35 Units into the skin nightly 1 vial 3    aspirin 81 MG tablet Take 81 mg by mouth daily      gabapentin (NEURONTIN) 100 MG capsule Take 2 capsules by mouth 3 times daily 90 capsule 3    insulin aspart (NOVOLOG) 100 UNIT/ML injection vial Inject 5 Units into the skin 3 times daily extremities. TMA noted on the left lower extremity with no open wounds noted. Wound noted on the posterior aspect of the right lower extremity. Wound has fibrotic and nonviable tissue that extends down through and includes the subcutaneous tissue/muscle/fascia. After debridement the wound has a fibro-granular base. There is  No urrounding erythema, warmth or malodor noted. The wound does not probe or track to bone.     Assessment:     Patient Active Problem List   Diagnosis    Osteomyelitis ([address]    Dyslipidemia    Carotid bruit present    S/P amputation    Abscess of third toe of left foot    Diabetic neuropathy (HCC)    Ulcer of toe of left foot (HCC)    Onychomycosis    Pain of right heel    PVD (peripheral vascular disease) ([address]    Abscess of foot    Acute renal failure (ARF) ([address]    Dehydration with hyponatremia    Diabetic infection of right foot ([address]    TUTU (acute kidney injury) ([address]    Uncontrolled type 1 diabetes mellitus ([address]    Hypertension    Non compliance w medication regimen    PAD (peripheral artery disease) ([address]    History of osteomyelitis    Anemia    [facility]    Foot ulcer ([address]    Diabetic foot ulcer ([address]    Atherosclerosis of native arteries of the extremities with ulceration ([address]    Foot osteomyelitis ([address]    Edema of lower extremity    Wound infection    Wound dehiscence    Post-op pain    Osteomyelitis of left foot ([address]    Surgical wound dehiscence    VRE infection (vancomycin resistant Enterococcus)    Pseudomonas infection    Type 2 diabetes mellitus with diabetic polyneuropathy (HCC)    Type 1 diabetes mellitus with diabetic peripheral angiopathy with gangrene ([address]    Diabetic foot ulcer with osteomyelitis ([address]    Atherosclerosis of native artery of left lower extremity with ulceration of calf (HCC)    Failure of artificial skin graft    Pain management contract agreement    Diabetic ulcer of left

## 2017-12-26 DIAGNOSIS — E10.8 TYPE 1 DIABETES MELLITUS WITH COMPLICATION (HCC): ICD-10-CM

## 2017-12-26 RX ORDER — MULTIVIT-MIN/IRON FUM/FOLIC AC 7.5 MG-4
1 TABLET ORAL DAILY
Qty: 30 TABLET | Refills: 3
Start: 2017-12-26 | End: 2017-12-29 | Stop reason: SDUPTHER

## 2017-12-26 RX ORDER — GABAPENTIN 100 MG/1
200 CAPSULE ORAL 3 TIMES DAILY
Qty: 90 CAPSULE | Refills: 1
Start: 2017-12-26 | End: 2018-02-05 | Stop reason: SDUPTHER

## 2017-12-26 RX ORDER — CARVEDILOL 25 MG/1
25 TABLET ORAL 2 TIMES DAILY WITH MEALS
Qty: 60 TABLET | Refills: 3
Start: 2017-12-26 | End: 2017-12-29 | Stop reason: SDUPTHER

## 2017-12-29 DIAGNOSIS — E10.8 TYPE 1 DIABETES MELLITUS WITH COMPLICATION (HCC): ICD-10-CM

## 2017-12-29 RX ORDER — CARVEDILOL 25 MG/1
25 TABLET ORAL 2 TIMES DAILY WITH MEALS
Qty: 60 TABLET | Refills: 1
Start: 2017-12-29 | End: 2018-06-07 | Stop reason: SDUPTHER

## 2017-12-29 RX ORDER — MULTIVIT-MIN/IRON FUM/FOLIC AC 7.5 MG-4
1 TABLET ORAL DAILY
Qty: 30 TABLET | Refills: 3
Start: 2017-12-29 | End: 2018-06-07 | Stop reason: SDUPTHER

## 2018-01-02 ENCOUNTER — HOSPITAL ENCOUNTER (OUTPATIENT)
Dept: WOUND CARE | Age: 53
Discharge: HOME | End: 2018-01-02
Attending: PODIATRIST | Admitting: PODIATRIST

## 2018-01-02 VITALS
HEART RATE: 92 BPM | SYSTOLIC BLOOD PRESSURE: 97 MMHG | RESPIRATION RATE: 18 BRPM | DIASTOLIC BLOOD PRESSURE: 57 MMHG | TEMPERATURE: 97.9 F

## 2018-01-02 LAB
GLUCOSE BLD-MCNC: 174 MG/DL (ref 70–99)
PERFORMED ON: ABNORMAL

## 2018-01-02 RX ORDER — LIDOCAINE HYDROCHLORIDE 40 MG/ML
SOLUTION TOPICAL ONCE
Status: COMPLETED | OUTPATIENT
Start: 2018-01-02 | End: 2018-01-02

## 2018-01-02 RX ADMIN — LIDOCAINE HYDROCHLORIDE 2.5 ML: 40 SOLUTION TOPICAL at 14:59

## 2018-01-02 ASSESSMENT — PAIN SCALES - GENERAL: PAINLEVEL_OUTOF10: 8

## 2018-01-02 ASSESSMENT — PAIN DESCRIPTION - FREQUENCY: FREQUENCY: INTERMITTENT

## 2018-01-02 ASSESSMENT — PAIN DESCRIPTION - PAIN TYPE: TYPE: ACUTE PAIN

## 2018-01-02 ASSESSMENT — PAIN DESCRIPTION - LOCATION: LOCATION: GROIN;FOOT

## 2018-01-02 ASSESSMENT — PAIN DESCRIPTION - ONSET: ONSET: ON-GOING

## 2018-01-02 ASSESSMENT — PAIN DESCRIPTION - ORIENTATION: ORIENTATION: RIGHT;LEFT

## 2018-01-02 ASSESSMENT — PAIN DESCRIPTION - DESCRIPTORS: DESCRIPTORS: ACHING

## 2018-01-16 PROBLEM — E08.11 DIABETIC KETOACIDOSIS WITH COMA ASSOCIATED WITH DIABETES MELLITUS DUE TO UNDERLYING CONDITION (HCC): Status: ACTIVE | Noted: 2018-01-16

## 2018-01-20 PROBLEM — J96.90 RESPIRATORY FAILURE REQUIRING INTUBATION (HCC): Status: ACTIVE | Noted: 2018-01-20

## 2018-01-30 ENCOUNTER — HOSPITAL ENCOUNTER (OUTPATIENT)
Dept: WOUND CARE | Age: 53
Discharge: HOME | End: 2018-01-30
Attending: PODIATRIST | Admitting: PODIATRIST

## 2018-01-30 VITALS
DIASTOLIC BLOOD PRESSURE: 67 MMHG | TEMPERATURE: 98.5 F | HEART RATE: 93 BPM | SYSTOLIC BLOOD PRESSURE: 124 MMHG | RESPIRATION RATE: 18 BRPM

## 2018-01-30 LAB
GLUCOSE BLD-MCNC: 81 MG/DL (ref 70–99)
PERFORMED ON: NORMAL

## 2018-01-30 RX ORDER — LIDOCAINE HYDROCHLORIDE 40 MG/ML
2.5 SOLUTION TOPICAL ONCE
Status: DISCONTINUED | OUTPATIENT
Start: 2018-01-30 | End: 2018-01-31 | Stop reason: HOSPADM

## 2018-01-30 ASSESSMENT — PAIN DESCRIPTION - DESCRIPTORS: DESCRIPTORS: SHARP

## 2018-01-30 ASSESSMENT — PAIN SCALES - GENERAL: PAINLEVEL_OUTOF10: 8

## 2018-01-30 ASSESSMENT — PAIN DESCRIPTION - PAIN TYPE: TYPE: ACUTE PAIN;CHRONIC PAIN

## 2018-01-30 ASSESSMENT — PAIN DESCRIPTION - ORIENTATION: ORIENTATION: RIGHT

## 2018-01-30 ASSESSMENT — PAIN DESCRIPTION - LOCATION: LOCATION: FOOT

## 2018-01-30 NOTE — PROGRESS NOTES
FIRST RIB REMOVAL Left 03/24/2016    Dr. Sydney Barahona - via trans-axillary approach    FOOT DEBRIDEMENT Left 9/9/2015    FOOT DEBRIDEMENT Left 12/02/2015    FOOT DEBRIDEMENT Left 09/11/2015    w/delayed primary closure    FOOT SURGERY Left 4/2/2015    INCISION AND DRAINAGE LEFT FOOT WITH DEBRIDEMENT    FOOT SURGERY Left 08/11/2015    TRANSMETATARSAL OSTEOTOMY LEFT FOOT       FOOT SURGERY Left [id number]    FOOT SURGERY Left 12/07/2015    INCISION AND DRAINAGE LEFT FOOT TO BONE AND CORTEX WITH    OTHER SURGICAL HISTORY  4/10/15    LEFT SUPERFICIAL FEMORAL ARTERY TO POSTERIOR TIBIAL ARTERY IN    OTHER SURGICAL HISTORY Left 5/11/215    INCISION AND DEBRIDEMENT LEFT INNER THIGH WOUND AND LEFT CALF    OTHER SURGICAL HISTORY  5/19/2015    INCISION AND DRAINAGE WITH REMOVAL OF NECROTIC SOFT TISSUE    OTHER SURGICAL HISTORY  07/03/2016    FORMAL PARTIAL 1ST RAY AMPUTATION, INCISION AND DRAINAGE DOWN    THROMBOENDARTERECTOMY Left 9/9/2015    POPLITEAL TO TIBIAL ENDARETCTOMY; ABORTED FEM- POP BYPASS    TOE AMPUTATION  09-    left great toe amputation       FAMILY HISTORY    Family History   Problem Relation Age of Onset    High Blood Pressure Mother     Diabetes Mother     Diabetes Sister     Diabetes Brother        SOCIAL HISTORY    Social History   Substance Use Topics    Smoking status: Current Some Day Smoker     Types: Cigars     Last attempt to quit: 12/21/2015    Smokeless tobacco: Never Used      Comment: only smokes cigars a couple times a week    Alcohol use No      Comment: rare       ALLERGIES    Allergies   Allergen Reactions    Toradol [Ketorolac Tromethamine] Nausea And Vomiting     Nausea/emesis, near syncopal past 2x       MEDICATIONS    Current Outpatient Prescriptions on File Prior to Encounter   Medication Sig Dispense Refill    hydrALAZINE (APRESOLINE) 50 MG tablet Take 1 tablet by mouth every 8 hours 90 tablet 3    NIFEdipine (ADALAT CC) 60 MG extended release tablet Take 1 tablet Description Full thickness 1/30/2018  3:27 PM   Yellow%Wound Bed 100 1/30/2018  3:27 PM   Op First Treatment Date 01/30/18 1/30/2018  3:27 PM   Number of days: 0     Percent of Wound Debrided: 100%    Total Surface Area Debrided:  19.25 sq cm     Diabetic/Pressure/Non Pressure Ulcers:  Ulcer: Diabetic ulcer, muscle necrosis    Bleeding:  Minimal    Hemostasis Achieved:  by pressure    Procedural Pain:  0  / 10     Post Procedural Pain:  0 / 10     Response to treatment:  Well tolerated by patient. Plan:   Discussed with patient at length that although his circulation is improved since his angiogram he still has single vessel runoff to his foot. If he limb is to be salvaged he MUST be compliant with managing his DM. Discussed with patient continued wound care vs primary BKA as repeat arterial doppler demonstrate healing unlikely at the pedal level. Patient relates that I should \"quit talking and wrap my foot\" bc he had to get to the pharmacy before it closes. Encouraged patient to follow up with PCP about increasing edema. He relates that he has an appointment scheduled for next week. Encouraged patient to allow us to re-check his sugar. He refused. Discussed with patient that I am not optimistic that this wound will heal due to multiple co morbidities and poor adherence to treatment recommendations. Continue local wound care with Santyl     Patient was encouraged to maintain non-weight bearing. He was encouraged to use his heel suspension booties to offload the area at all times while not ambulating. Treatment Note please see attached Discharge Instructions    In my professional opinion this patient would benefit from HBO Therapy:No, he is not a candidate due to poor glycemic control    Patient is to return to wound care center in:  1 week(s)    Written patient dismissal instructions given to patient and signed by patient or POA.            Discharge [facility] Physician Orders and Discharge Instructions  [facility] [address]   [place], [address]  Telephone: [phone]    NAME:  Peggy Hodgkin  YOB: 1965  MEDICAL RECORD NUMBER:  [id number]  DATE:  1/30/2018    Wash hands with soap and water prior to and after every dressing change. Wound Cleansing:   · Do not scrub or use excessive force. · With each dressing change, rinse wounds with 0.9% Saline. (May use wound wash or soft contact solution. Both can be purchased at a local drug store). · If unable to obtain saline, may use a gentle soap and water. · Keep wounds dry in the shower unless otherwise instructed by the physician. Topical Treatments:  Do not apply lotions, creams, or ointments to wound bed unless directed. [x] Apply moisturizing lotion to skin surrounding the wound prior to dressing change.  [] Apply antifungal ointment to skin surrounding the wound prior to dressing change.  [] Apply thin film of moisture barrier ointment to skin immediately around wound. [] Other:      Dressings:           Wound Location: Right Heel     [x] Apply Primary Dressing to wound:       [] Foam/Foam with Border(i.e Mepilex) [] Non-adherent (i.e.Mepitel)   [] Alginate with Silver (i.e. Silvercel)   [] Alginate   [] Collagen (i.e. Puracol) [] Collagen with Silver(i.e. Eusebia)     [] Hydrocolloid  [] Hydrafera Blue moistened with saline   [] MediHoney Paste/Gel [] Hydrogel      [x] Santyl covered with gauze moisten with saline    [] Bactroban/Mupirocin [] Polysporin  [] Other:      Pack wound loosely with  [] Iodoform   [] Plain Packing  [] Saline \"wet to dry\"      [x] Cover and Secure with:     [x] Dry Gauze [] ABD [] Stretch bandage roll [] Kerlix   [] Coban [] Ace Wrap [] Cover Roll Tape    [] Other:    Avoid contact of tape with skin.     [x] Change dressing: [x] Daily    [] Every Other Day [] Three times per week   [] Once a week [] Do Not Change Dressing   [] Other:       Edema Control:  Apply: [] Compression Stocking []Right Leg []Left Leg     [x] SpandaGrip [x]Right Leg  [x]Left Leg      [x]Low compression 5-10 mm/Hg      []Medium compression 10-20 mm/Hg     []High compression  20-30 mm/Hg    Apply every morning immediately when getting up. They should be applied to affected leg(s) from mid foot to knee making sure to cover the heel. Remove every night before going to bed. [x] Elevate leg(s) above the level of the heart for 30 minutes 4-5 times a day and/or when sitting. [x] Avoid prolonged standing in one place. Off-Loading:   [] Off-loading when: [] walking  [] in bed [] sitting    [] Total non-weight bearing:  [] Right Leg  [] Left Leg     [x] Partial weight bearing:   [x] Right Leg (Transfer weight bearing only)  [] Left Leg    [] No weight bearing restrictions     [x] Assistive Device: [x] Walker [] Crutches  [x] Wheelchair [] Roll About   [x] Surgical shoe/Darco    [] Podus Boot(s)   [] Prevalon Boot(s)  [] Huber Holding    [] Cast/CAM Boot [] Other:      Dietary:  Important dietary reminders:  1. Increase Protein intake (i.e. Lean meats, fish, eggs, legumes, and yogurt)  2. No added salt  3. If diabetic, follow a diabetic diet and check glucose prior to meals or as instructed by your physician.         Return Appointment:  [] Wound and dressing supply provider:   [x] [facility] or Home Healthcare: [address]:  Phone: [phone]  Fax: [phone]    [] Nurse visit:    [x] Return Appointment: With Dr Karen Ch  in  [facility])    [] Ordered tests:       Referrals: (see attached referral)    [] Weight Management [] Diabetes Education [] Vascular Surgery  [] Endocrinology  [] Nutrition Counseling  [] Lymphedema Therapy     Your nurse  is:  Mariah Tao     Electronically signed by Stormy Packer RN on 1/30/2018 at 4:20 PM     [facility] Information: Should you experience any significant changes in your

## 2018-02-05 RX ORDER — GABAPENTIN 100 MG/1
200 CAPSULE ORAL 3 TIMES DAILY
Qty: 90 CAPSULE | Refills: 1
Start: 2018-02-05 | End: 2018-02-08 | Stop reason: ALTCHOICE

## 2018-02-06 ENCOUNTER — HOSPITAL ENCOUNTER (OUTPATIENT)
Dept: WOUND CARE | Age: 53
Discharge: HOME | End: 2018-02-06
Attending: PODIATRIST | Admitting: PODIATRIST

## 2018-02-06 VITALS
SYSTOLIC BLOOD PRESSURE: 158 MMHG | TEMPERATURE: 98.7 F | RESPIRATION RATE: 16 BRPM | DIASTOLIC BLOOD PRESSURE: 78 MMHG | HEART RATE: 101 BPM

## 2018-02-06 LAB
GLUCOSE BLD-MCNC: 258 MG/DL (ref 70–99)
PERFORMED ON: ABNORMAL

## 2018-02-06 RX ORDER — LIDOCAINE HYDROCHLORIDE 40 MG/ML
2.5 SOLUTION TOPICAL ONCE
Status: DISCONTINUED | OUTPATIENT
Start: 2018-02-06 | End: 2018-02-07 | Stop reason: HOSPADM

## 2018-02-06 ASSESSMENT — PAIN DESCRIPTION - ONSET: ONSET: ON-GOING

## 2018-02-06 ASSESSMENT — PAIN SCALES - GENERAL: PAINLEVEL_OUTOF10: 10

## 2018-02-06 ASSESSMENT — PAIN DESCRIPTION - DESCRIPTORS: DESCRIPTORS: SHARP

## 2018-02-06 ASSESSMENT — PAIN DESCRIPTION - LOCATION: LOCATION: FOOT

## 2018-02-06 ASSESSMENT — PAIN DESCRIPTION - ORIENTATION: ORIENTATION: RIGHT

## 2018-02-06 ASSESSMENT — PAIN DESCRIPTION - FREQUENCY: FREQUENCY: CONTINUOUS

## 2018-02-06 ASSESSMENT — PAIN DESCRIPTION - PAIN TYPE: TYPE: ACUTE PAIN

## 2018-02-06 ASSESSMENT — PAIN DESCRIPTION - PROGRESSION: CLINICAL_PROGRESSION: NOT CHANGED

## 2018-02-06 NOTE — PROGRESS NOTES
extended release tablet Take 1 tablet by mouth daily 30 tablet 3    carvedilol (COREG) 25 MG tablet Take 1 tablet by mouth 2 times daily (with meals) 60 tablet 1    Multiple Vitamins-Minerals (MULTIVITAMIN WITH MINERALS) tablet Take 1 tablet by mouth daily 30 tablet 3    insulin glargine (LANTUS) 100 UNIT/ML injection vial Inject 35 Units into the skin nightly 1 vial 3    aspirin 81 MG tablet Take 81 mg by mouth daily      insulin aspart (NOVOLOG) 100 UNIT/ML injection vial Inject 5 Units into the skin 3 times daily (before meals) 1 vial 3    apixaban (ELIQUIS) 5 MG TABS tablet Take 1 tablet by mouth 2 times daily 60 tablet 3    atorvastatin (LIPITOR) 80 MG tablet Take 1 tablet by mouth daily 30 tablet 3    clopidogrel (PLAVIX) 75 MG tablet Take 1 tablet by mouth daily 30 tablet 3    acetaminophen (AMINOFEN) 325 MG tablet Take 2 tablets by mouth every 6 hours as needed for Pain 120 tablet 3    Elastic Bandages & Supports (KNEE BRACE ADJUSTABLE HINGED) MISC 1 Units by Does not apply route as needed (knee pain) 1 each 0    Elastic Bandages & Supports (KNEE BRACE ADJUSTABLE HINGED) MISC 1 Units by Does not apply route as needed (knee pain) 1 each 0     No current facility-administered medications on file prior to encounter. REVIEW OF SYSTEMS    Pertinent items are noted in HPI. Review of Systems: A 12 point review of symptoms is unremarkable with the exception of the chief complaint. Patient specifically denies nausea, fever, vomiting, chills, shortness of breath, chest pain, abdominal pain, constipation or difficulty urinating. Objective:      BP (!) 158/78   Pulse 101   Temp 98.7 °F (37.1 °C) (Oral)   Resp 16     Wt Readings from Last 3 Encounters:   01/24/18 191 lb 1.6 oz (86.7 kg)   11/21/17 195 lb (88.5 kg)   11/16/17 153 lb 10.6 oz (69.7 kg)       PHYSICAL EXAM    Patient is a little confused and slurring his speech.   BG 81, drank orange juice, refused snack or repeat

## 2018-02-08 ENCOUNTER — OFFICE VISIT (OUTPATIENT)
Dept: INTERNAL MEDICINE | Age: 53
End: 2018-02-08
Attending: STUDENT IN AN ORGANIZED HEALTH CARE EDUCATION/TRAINING PROGRAM

## 2018-02-08 VITALS
TEMPERATURE: 99.2 F | SYSTOLIC BLOOD PRESSURE: 132 MMHG | OXYGEN SATURATION: 100 % | HEART RATE: 95 BPM | DIASTOLIC BLOOD PRESSURE: 72 MMHG

## 2018-02-08 DIAGNOSIS — I10 ESSENTIAL HYPERTENSION: ICD-10-CM

## 2018-02-08 DIAGNOSIS — E10.8 TYPE 1 DIABETES MELLITUS WITH COMPLICATION (HCC): Primary | ICD-10-CM

## 2018-02-08 LAB
GLUCOSE BLD-MCNC: 205 MG/DL (ref 70–99)
PERFORMED ON: ABNORMAL

## 2018-02-08 RX ORDER — CAPSAICIN 0.025 %
CREAM (GRAM) TOPICAL
Qty: 56.6 G | Refills: 1 | Status: ON HOLD | OUTPATIENT
Start: 2018-02-08 | End: 2018-03-12 | Stop reason: HOSPADM

## 2018-02-08 RX ORDER — PREGABALIN 25 MG/1
25 CAPSULE ORAL 3 TIMES DAILY
Qty: 60 CAPSULE | Refills: 3 | Status: SHIPPED | OUTPATIENT
Start: 2018-02-08 | End: 2018-02-08 | Stop reason: SDUPTHER

## 2018-02-08 RX ORDER — PREGABALIN 25 MG/1
25 CAPSULE ORAL 2 TIMES DAILY
Qty: 60 CAPSULE | Refills: 3 | Status: SHIPPED | OUTPATIENT
Start: 2018-02-08 | End: 2018-05-21

## 2018-02-08 RX ORDER — INSULIN GLARGINE 100 [IU]/ML
45 INJECTION, SOLUTION SUBCUTANEOUS NIGHTLY
Qty: 1 VIAL | Refills: 3 | Status: SHIPPED | OUTPATIENT
Start: 2018-02-08 | End: 2018-02-08 | Stop reason: SDUPTHER

## 2018-02-08 RX ORDER — INSULIN GLARGINE 100 [IU]/ML
40 INJECTION, SOLUTION SUBCUTANEOUS NIGHTLY
Qty: 1 VIAL | Refills: 3 | Status: ON HOLD | OUTPATIENT
Start: 2018-02-08 | End: 2018-03-12 | Stop reason: HOSPADM

## 2018-02-08 NOTE — PATIENT INSTRUCTIONS
Call your pharmacy for medication refills at least 48 hours ahead at [phone] (Monday -Friday, 08:00 AM to 4:00 PM .If you become ill when the clinic is closed, please call [facility] [employer].  at [phone] and ask the  to page the AOD between 6:00 AM and 6:00 PM or page the AON between 6:00 PM & 6:00 AM.    The clinic is not able to process [facility] requests for appointments or messages including refill requests. Please call the [facility] at [phone] for appointments and messages. The [facility]  Telephone:[phone]    Instructions reviewed with patient and copy given to patient upon discharge. 3:21 PM2/8/2018     Call your pharmacy for medication refills at least 48 hours ahead at [phone] (Monday -Friday, 08:00 AM to 4:00 PM .If you become ill when the clinic is closed, please call [facility] [employer]  at [phone] and ask the  to page the AOD between 6:00 AM and 6:00 PM or page the AON between 6:00 PM & 6:00 AM.    The clinic is not able to process [facility] requests for appointments or messages including refill requests. Please call the [facility] at [phone] for appointments and messages.      [facility]  Telephone:[phone]    Instructions reviewed with patient and copy given to patient upon discharge by [facility] MARCUSRN   Return to clinic in one month   Prescriptions given Lantus take 40 units in the skin nightly   Zostrix cream apply twice a day to foot  Prescription given   Prescription for knee brace given  Prescription given for Lyrica 25 mg take 2 capsules by mouth every day     3:22 PM2/8/2018

## 2018-02-15 DIAGNOSIS — R26.81 GAIT INSTABILITY: Primary | ICD-10-CM

## 2018-02-15 PROBLEM — M19.072 PRIMARY OSTEOARTHRITIS OF LEFT FOOT: Status: ACTIVE | Noted: 2018-02-15

## 2018-02-20 ENCOUNTER — TELEPHONE (OUTPATIENT)
Dept: WOUND CARE | Age: 53
End: 2018-02-20

## 2018-02-25 PROBLEM — E10.10 DKA, TYPE 1, NOT AT GOAL (HCC): Status: ACTIVE | Noted: 2018-02-25

## 2018-02-26 PROBLEM — E11.621 DIABETIC ULCER OF RIGHT HEEL ASSOCIATED WITH TYPE 2 DIABETES MELLITUS, WITH NECROSIS OF BONE (HCC): Status: ACTIVE | Noted: 2018-02-26

## 2018-02-26 PROBLEM — L97.414 DIABETIC ULCER OF RIGHT HEEL ASSOCIATED WITH TYPE 2 DIABETES MELLITUS, WITH NECROSIS OF BONE (HCC): Status: ACTIVE | Noted: 2018-02-26

## 2018-03-19 ENCOUNTER — TELEPHONE (OUTPATIENT)
Dept: INFECTIOUS DISEASES | Age: 53
End: 2018-03-19

## 2018-03-19 NOTE — TELEPHONE ENCOUNTER
The nurse called from nursing home and wanted to know if you wanted a hospital followup appointment for this patient.

## 2018-03-20 ENCOUNTER — HOSPITAL ENCOUNTER (OUTPATIENT)
Dept: WOUND CARE | Age: 53
Discharge: HOME | End: 2018-03-20
Attending: PODIATRIST | Admitting: PODIATRIST

## 2018-03-20 VITALS
RESPIRATION RATE: 16 BRPM | DIASTOLIC BLOOD PRESSURE: 67 MMHG | TEMPERATURE: 98.2 F | HEART RATE: 105 BPM | SYSTOLIC BLOOD PRESSURE: 120 MMHG

## 2018-03-20 LAB
GLUCOSE BLD-MCNC: 119 MG/DL (ref 70–99)
PERFORMED ON: ABNORMAL

## 2018-03-20 RX ORDER — OXYCODONE HCL 10 MG/1
10 TABLET, FILM COATED, EXTENDED RELEASE ORAL EVERY 12 HOURS
COMMUNITY

## 2018-03-20 RX ORDER — SENNA PLUS 8.6 MG/1
1 TABLET ORAL DAILY PRN
COMMUNITY
End: 2018-06-07 | Stop reason: SDUPTHER

## 2018-03-20 RX ORDER — LIDOCAINE HYDROCHLORIDE 40 MG/ML
2.5 SOLUTION TOPICAL ONCE
Status: DISCONTINUED | OUTPATIENT
Start: 2018-03-20 | End: 2018-03-21 | Stop reason: HOSPADM

## 2018-03-20 ASSESSMENT — PAIN SCALES - GENERAL: PAINLEVEL_OUTOF10: 8

## 2018-03-20 ASSESSMENT — PAIN DESCRIPTION - PAIN TYPE: TYPE: SURGICAL PAIN;ACUTE PAIN

## 2018-03-20 ASSESSMENT — PAIN DESCRIPTION - LOCATION: LOCATION: LEG

## 2018-03-20 ASSESSMENT — PAIN DESCRIPTION - PROGRESSION: CLINICAL_PROGRESSION: NOT CHANGED

## 2018-03-20 ASSESSMENT — PAIN DESCRIPTION - DESCRIPTORS: DESCRIPTORS: SHARP

## 2018-03-20 ASSESSMENT — PAIN DESCRIPTION - ONSET: ONSET: ON-GOING

## 2018-03-20 ASSESSMENT — PAIN DESCRIPTION - FREQUENCY: FREQUENCY: INTERMITTENT

## 2018-03-20 ASSESSMENT — PAIN DESCRIPTION - ORIENTATION: ORIENTATION: RIGHT

## 2018-03-20 NOTE — PROGRESS NOTES
DEBRIDEMENT    FOOT SURGERY Left 08/11/2015    TRANSMETATARSAL OSTEOTOMY LEFT FOOT       FOOT SURGERY Left 12/07/2015    INCISION AND DRAINAGE LEFT FOOT TO BONE AND CORTEX WITH    LEG AMPUTATION BELOW KNEE Right 03/07/2018    RIGHT LEG BELOW KNEE AMPUTATION           OTHER SURGICAL HISTORY  4/10/15    LEFT SUPERFICIAL FEMORAL ARTERY TO POSTERIOR TIBIAL ARTERY IN    OTHER SURGICAL HISTORY Left 5/11/215    INCISION AND DEBRIDEMENT LEFT INNER THIGH WOUND AND LEFT CALF    OTHER SURGICAL HISTORY  5/19/2015    INCISION AND DRAINAGE WITH REMOVAL OF NECROTIC SOFT TISSUE    OTHER SURGICAL HISTORY  07/03/2016    FORMAL PARTIAL 1ST RAY AMPUTATION, INCISION AND DRAINAGE DOWN    THROMBOENDARTERECTOMY Left 9/9/2015    POPLITEAL TO TIBIAL ENDARETCTOMY; ABORTED FEM- POP BYPASS    TOE AMPUTATION  09-    left great toe amputation       FAMILY HISTORY    Family History   Problem Relation Age of Onset    High Blood Pressure Mother     Diabetes Mother     Diabetes Sister     Diabetes Brother        SOCIAL HISTORY    Social History   Substance Use Topics    Smoking status: Current Some Day Smoker     Types: Cigars     Last attempt to quit: 12/21/2015    Smokeless tobacco: Never Used      Comment: only smokes cigars a couple times a week    Alcohol use No      Comment: rare       ALLERGIES    Allergies   Allergen Reactions    Toradol [Ketorolac Tromethamine] Nausea And Vomiting     Nausea/emesis, near syncopal past 2x       MEDICATIONS    Current Outpatient Prescriptions on File Prior to Encounter   Medication Sig Dispense Refill    gabapentin (NEURONTIN) 100 MG capsule Take 2 capsules by mouth 3 times daily for 20 days.  120 capsule 0    polyethylene glycol (GLYCOLAX) packet Take 17 g by mouth daily 527 g 1    hydrALAZINE (APRESOLINE) 50 MG tablet Take 1 tablet by mouth every 8 hours 90 tablet 3    NIFEdipine (ADALAT CC) 60 MG extended release tablet Take 1 tablet by mouth daily 30 tablet 3    carvedilol (COREG) 25 MG tablet Take 1 tablet by mouth 2 times daily (with meals) 60 tablet 1    Multiple Vitamins-Minerals (MULTIVITAMIN WITH MINERALS) tablet Take 1 tablet by mouth daily 30 tablet 3    apixaban (ELIQUIS) 5 MG TABS tablet Take 1 tablet by mouth 2 times daily 60 tablet 3    atorvastatin (LIPITOR) 80 MG tablet Take 1 tablet by mouth daily 30 tablet 3    acetaminophen (TYLENOL) 500 MG tablet Take 2 tablets by mouth every 6 hours 120 tablet 3    insulin glargine (LANTUS) 100 UNIT/ML injection pen Inject 10 Units into the skin nightly 5 pen 3    pregabalin (LYRICA) 25 MG capsule Take 1 capsule by mouth 2 times daily for 30 days. 60 capsule 3    clopidogrel (PLAVIX) 75 MG tablet Take 1 tablet by mouth daily 30 tablet 3     No current facility-administered medications on file prior to encounter. REVIEW OF SYSTEMS    Pertinent items are noted in HPI. Review of Systems: A 12 point review of symptoms is unremarkable with the exception of the chief complaint. Patient specifically denies nausea, fever, vomiting, chills, shortness of breath, chest pain, abdominal pain, constipation or difficulty urinating. Objective:      /67   Pulse 105   Temp 98.2 °F (36.8 °C) (Oral)   Resp 16     Wt Readings from Last 3 Encounters:   03/14/18 162 lb 11.2 oz (73.8 kg)   01/24/18 191 lb 1.6 oz (86.7 kg)   11/21/17 195 lb (88.5 kg)       PHYSICAL EXAM    Right BKA. Wound noted on his left heel. Wound has fibrotic and nonviable tissue that extends down through and includes the subcutaneous tissue. After debridement the wound has a granular base. There is no surrounding erythema, edema, warmth or malodor noted. The wound does not probe or track to bone.         Assessment:      Patient Active Problem List   Diagnosis Code    Osteomyelitis ([facility] [address] M86.9    Dyslipidemia E78.5    Carotid bruit present R09.89    S/P amputation Z89.9    Abscess of third toe of left foot L02.612    Diabetic neuropathy ([facility] [address] 3/14/2018  3:02 PM   Number of days: 13       Percent of Wound(s)/Ulcer(s) Debrided: 100%    Total Surface Area Debrided:  3 sq cm       Diabetic/Pressure/Non Pressure Ulcers only:  Ulcer: Diabetic ulcer, fat layer exposed      Estimated Blood Loss:  Minimal    Hemostasis Achieved:  by pressure    Procedural Pain:  0  / 10     Post Procedural Pain:  0 / 10     Response to treatment:  Well tolerated by patient. Plan:   Continue local wound care with santyl. Offload the area at all time while in bed. Treatment Note please see attached Discharge Instructions    Written patient dismissal instructions given to patient and signed by patient or POA. RTC 1 week. Discharge [facility] Physician Orders and Discharge Instructions  [facility] [address]   [place], [address]  Telephone: [phone]    NAME:  Becka Tenorio  YOB: 1965  MEDICAL RECORD NUMBER:  [id number]  DATE:  3/20/2018    Wash hands with soap and water prior to and after every dressing change. Wound Cleansing:   · Do not scrub or use excessive force. · With each dressing change, rinse wounds with 0.9% Saline. (May use wound wash or soft contact solution. Both can be purchased at a local drug store). · If unable to obtain saline, may use a gentle soap and water. · Keep wounds dry in the shower unless otherwise instructed by the physician. Topical Treatments:  Do not apply lotions, creams, or ointments to wound bed unless directed. [] Apply moisturizing lotion to skin surrounding the wound prior to dressing change.  [] Apply antifungal ointment to skin surrounding the wound prior to dressing change.  [] Apply thin film of moisture barrier ointment to skin immediately around wound.   [] Other:      Dressings:           Wound Location:  Left outer ankle wound     [x] Apply Primary Dressing to wound:       [] Foam/Foam with Border(i.e Mepilex) [] Non-adherent (i.e.Mepitel)   [] Alginate with Silver (i.e. Silvercel)   [] Alginate   [] Collagen (i.e. Puracol) [] Collagen with Silver(i.e. Eusebia)     [] Hydrocolloid  [] Hydrafera Blue moistened with saline   [] MediHoney Paste/Gel [] Hydrogel      [x] Santyl covered with gauze moisten with saline    [] Bactroban/Mupirocin [] Polysporin  [] Other:      Pack wound loosely with  [] Iodoform   [] Plain Packing  [] Saline \"wet to dry\"      [x] Cover and Secure with:     [x] Dry Gauze [] ABD [] Stretch bandage roll [x] Kerlix   [] Coban [] Ace Wrap [] Cover Roll Tape    [] Other:    Avoid contact of tape with skin. [x] Change dressing: [x] Daily    [] Every Other Day [] Three times per week   [] Once a week [] Do Not Change Dressing   [] Other:         Edema Control:  Apply: [] Compression Stocking []Right Leg []Left Leg     [x] SpandaGrip []Right Leg  [x]Left Leg      []Low compression 5-10 mm/Hg      [x]Medium compression 10-20 mm/Hg     []High compression  20-30 mm/Hg    Apply every morning immediately when getting up. They should be applied to affected leg(s) from mid foot to knee making sure to cover the heel. Remove every night before going to bed. [x] Elevate leg(s) above the level of the heart for 30 minutes 4-5 times a day and/or when sitting. [x] Avoid prolonged standing in one place. Off-Loading: May be full weight bearing   [] Off-loading when: [] walking  [] in bed [] sitting    [] Total non-weight bearing:  [] Right Leg  [] Left Leg     [] Partial weight bearing:   [] Right Leg  [] Left Leg    [x] No weight bearing restrictions     [] Assistive Device: [] Walker [] Crutches  [] Wheelchair [] Roll About   [] Surgical shoe/Darco    [] Podus Boot(s)   [] Prevalon Boot(s)  [] Elli Hand    [] Cast/CAM Boot [] Other:    Dietary:  Important dietary reminders:  1. Increase Protein intake (i.e. Lean meats, fish, eggs, legumes, and yogurt)  2. No added salt  3.  If

## 2018-03-22 ENCOUNTER — OFFICE VISIT (OUTPATIENT)
Dept: VASCULAR SURGERY | Age: 53
End: 2018-03-22

## 2018-03-22 VITALS — HEART RATE: 107 BPM | TEMPERATURE: 98.1 F | SYSTOLIC BLOOD PRESSURE: 128 MMHG | DIASTOLIC BLOOD PRESSURE: 61 MMHG

## 2018-03-22 PROCEDURE — 99024 POSTOP FOLLOW-UP VISIT: CPT | Performed by: SURGERY

## 2018-03-22 NOTE — PROGRESS NOTES
Outpatient Post-Op Visit  PATIENT NAME: June Dhillon     TODAY'S DATE: 3/22/2018    SUBJECTIVE:    Mr. Kasie Tristan is postop day 15 from right below-knee indication for right foot osteomyelitis. He is at [facility]. He has a depressed affect but states that he is doing [place]". No other complaints. OBJECTIVE:   VITALS:  /61 (Site: Left Arm, Position: Sitting)   Pulse 107   Temp 98.1 °F (36.7 °C) (Oral)                 CONSTITUTIONAL:  awake and alert, no acute distress. EXTREMITY: Right below-knee amputation incision is clean dry and intact. Staples and sutures were removed today and Steri-Strips applied. There is no erythema and no drainage. The stump is warm and well-perfused. ASSESSMENT AND PLAN:  47 y.o. male status post R BKA (2 weeks)  Overall he is doing very well. His incision looks good. We reapplied the stump  and we will slowly progressed to toe-touch with the IPOP over the next week or two. I will see him back in 2 weeks.      Lola Field MD, FACS

## 2018-03-27 ENCOUNTER — HOSPITAL ENCOUNTER (OUTPATIENT)
Dept: WOUND CARE | Age: 53
Discharge: HOME | End: 2018-03-27
Attending: PODIATRIST | Admitting: PODIATRIST

## 2018-03-27 VITALS
DIASTOLIC BLOOD PRESSURE: 60 MMHG | RESPIRATION RATE: 18 BRPM | HEART RATE: 102 BPM | TEMPERATURE: 98.1 F | SYSTOLIC BLOOD PRESSURE: 113 MMHG

## 2018-03-27 LAB
GLUCOSE BLD-MCNC: 201 MG/DL (ref 70–99)
PERFORMED ON: ABNORMAL

## 2018-03-27 RX ORDER — LIDOCAINE HYDROCHLORIDE 40 MG/ML
2.5 SOLUTION TOPICAL ONCE
Status: COMPLETED | OUTPATIENT
Start: 2018-03-27 | End: 2018-03-27

## 2018-03-27 RX ADMIN — LIDOCAINE HYDROCHLORIDE 2.5 ML: 40 SOLUTION TOPICAL at 14:25

## 2018-03-27 ASSESSMENT — PAIN DESCRIPTION - DESCRIPTORS: DESCRIPTORS: SHARP

## 2018-03-27 ASSESSMENT — PAIN DESCRIPTION - FREQUENCY: FREQUENCY: CONTINUOUS

## 2018-03-27 ASSESSMENT — PAIN DESCRIPTION - ONSET: ONSET: ON-GOING

## 2018-03-27 ASSESSMENT — PAIN DESCRIPTION - ORIENTATION: ORIENTATION: LEFT

## 2018-03-27 ASSESSMENT — PAIN DESCRIPTION - PAIN TYPE: TYPE: ACUTE PAIN;SURGICAL PAIN

## 2018-03-27 ASSESSMENT — PAIN SCALES - GENERAL: PAINLEVEL_OUTOF10: 10

## 2018-03-27 ASSESSMENT — PAIN DESCRIPTION - PROGRESSION: CLINICAL_PROGRESSION: NOT CHANGED

## 2018-03-27 ASSESSMENT — PAIN DESCRIPTION - LOCATION: LOCATION: LEG

## 2018-03-27 NOTE — PROGRESS NOTES
L[address] Far[address]   Progress Note and Procedure Note      Di Reynolds  MEDICAL RECORD NUMBER:  [id number]  AGE: 47 y.o. GENDER: male  : 1965  EPISODE DATE:  3/27/2018    Subjective:     Chief Complaint   Patient presents with    Wound Check     Left foot         HISTORY of PRESENT ILLNESS HPI     Di Reynolds is a 47 y.o. male who presents today for wound/ulcer evaluation. History of Wound Context: patient presents today for a left heel wound. 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 He has been having local wound care daily with santyl.   Wound/Ulcer Pain Timing/Severity: none  Quality of pain: N/A  Severity:  0 / 10   Modifying Factors: None  Associated Signs/Symptoms: edema    Ulcer Identification:  Ulcer Type: diabetic  Contributing Factors: diabetes and arterial insufficiency    Wound: N/A        PAST MEDICAL HISTORY        Diagnosis Date    Clostridium difficile diarrhea 2017    PCR    Hyperlipidemia     Hypertension     MDRO (multiple drug resistant organisms) resistance 2017    leg    MRSA (methicillin resistant staph aureus) culture positive 06/15/2016    foot; bone; blood; chest abscess    MRSA (methicillin resistant staph aureus) culture positive 2017    foot    Osteomyelitis of left foot ([facility] [address]     Type II or unspecified type diabetes mellitus without mention of complication, not stated as uncontrolled     VRE (vancomycin resistant enterococcus) culture positive 8/25/15    foot       PAST SURGICAL HISTORY    Past Surgical History:   Procedure Laterality Date    ABSCESS DRAINAGE Left 2016    Dr. Dre López - chest wall, I&D of phlegmon, placement of wound vac    FEMORAL-TIBIAL BYPASS GRAFT Left 2015    FIRST RIB REMOVAL Left 2016    Dr. Dre López - via trans-axillary approach    FOOT DEBRIDEMENT Left 2015    FOOT DEBRIDEMENT Left 2015    w/delayed primary closure    FOOT SURGERY Left 2015    INCISION capsules by mouth 3 times daily for 20 days. 120 capsule 0    hydrALAZINE (APRESOLINE) 50 MG tablet Take 1 tablet by mouth every 8 hours 90 tablet 3    NIFEdipine (ADALAT CC) 60 MG extended release tablet Take 1 tablet by mouth daily 30 tablet 3    carvedilol (COREG) 25 MG tablet Take 1 tablet by mouth 2 times daily (with meals) 60 tablet 1    Multiple Vitamins-Minerals (MULTIVITAMIN WITH MINERALS) tablet Take 1 tablet by mouth daily 30 tablet 3    apixaban (ELIQUIS) 5 MG TABS tablet Take 1 tablet by mouth 2 times daily 60 tablet 3    atorvastatin (LIPITOR) 80 MG tablet Take 1 tablet by mouth daily 30 tablet 3    senna (SENOKOT) 8.6 MG tablet Take 1 tablet by mouth daily as needed for Constipation      acetaminophen (TYLENOL) 500 MG tablet Take 2 tablets by mouth every 6 hours 120 tablet 3    polyethylene glycol (GLYCOLAX) packet Take 17 g by mouth daily 527 g 1    pregabalin (LYRICA) 25 MG capsule Take 1 capsule by mouth 2 times daily for 30 days. 60 capsule 3    clopidogrel (PLAVIX) 75 MG tablet Take 1 tablet by mouth daily 30 tablet 3     No current facility-administered medications on file prior to encounter. REVIEW OF SYSTEMS    Pertinent items are noted in HPI. Review of Systems: A 12 point review of symptoms is unremarkable with the exception of the chief complaint. Patient specifically denies nausea, fever, vomiting, chills, shortness of breath, chest pain, abdominal pain, constipation or difficulty urinating. Objective:      /60   Pulse 102   Temp 98.1 °F (36.7 °C) (Oral)   Resp 18     Wt Readings from Last 3 Encounters:   03/14/18 162 lb 11.2 oz (73.8 kg)   01/24/18 191 lb 1.6 oz (86.7 kg)   11/21/17 195 lb (88.5 kg)       PHYSICAL EXAM    Right BKA. Wound noted on his left heel. Wound has fibrotic and nonviable tissue that extends down through and includes the subcutaneous tissue. After debridement the wound has a granular base.   There is no surrounding erythema, edema, of days: 7       Incision 03/07/18 Leg Right (Active)   Wound Assessment DALIA 3/14/2018  3:02 PM   Allyson-wound Assessment DALIA 3/14/2018  3:02 PM   Closure Mike; Sutures 3/20/2018  1:02 PM   Drainage Amount None 3/20/2018  1:02 PM   Dressing/Treatment Dry dressing 3/14/2018  3:02 PM   Dressing Changed Other (Comment) 3/14/2018  3:02 PM   Dressing Status Clean;Dry; Intact 3/14/2018  3:02 PM   Number of days: 20     Percent of Wound(s)/Ulcer(s) Debrided: 100%    Total Surface Area Debrided:  2.25 sq cm       Diabetic/Pressure/Non Pressure Ulcers only:  Ulcer: Diabetic ulcer, fat layer exposed      Estimated Blood Loss:  Minimal    Hemostasis Achieved:  by pressure    Procedural Pain:  0  / 10     Post Procedural Pain:  0 / 10     Response to treatment:  Well tolerated by patient. Plan:   Continue local wound care with santyl. Offload the area at all time while in bed. Treatment Note please see attached Discharge Instructions    Written patient dismissal instructions given to patient and signed by patient or POA. RTC 1 week. Discharge [facility] Physician Orders and Discharge Instructions  [facility] [address] [address]  Telephone: [phone]    NAME:  Lea Torrez  YOB: 1965  MEDICAL RECORD NUMBER:  [id number]  DATE:  3/27/2018    Wash hands with soap and water prior to and after every dressing change. Wound Cleansing:   · Do not scrub or use excessive force. · With each dressing change, rinse wounds with 0.9% Saline. (May use wound wash or soft contact solution. Both can be purchased at a local drug store). · If unable to obtain saline, may use a gentle soap and water. · Keep wounds dry in the shower unless otherwise instructed by the physician. Topical Treatments:  Do not apply lotions, creams, or ointments to wound bed unless directed.      [] Apply moisturizing lotion to skin surrounding the wound prior to dressing change.  [] Apply antifungal ointment to skin surrounding the wound prior to dressing change.  [] Apply thin film of moisture barrier ointment to skin immediately around wound. [] Other:      Dressings:           Wound Location: Left Heel     [x] Apply Primary Dressing to wound:       [] Foam/Foam with Border(i.e Mepilex) [] Non-adherent (i.e.Mepitel)   [] Alginate with Silver (i.e. Silvercel)   [] Alginate   [] Collagen (i.e. Puracol) [] Collagen with Silver(i.e. Eusebia)     [] Hydrocolloid  [] Hydrafera Blue moistened with saline   [] MediHoney Paste/Gel [] Hydrogel      [x] Santyl covered with gauze moisten with saline     [] Bactroban/Mupirocin [] Polysporin/Neosporin  [] Other:      Pack wound loosely with: [] Iodoform   [] Plain Packing  [] Saline \"wet to dry\"      [x] Cover and Secure with:     [x] Dry Gauze [] ABD [] Stretch bandage roll [] Kerlix   [] Coban [] Ace Wrap [] Cover Roll Tape [] Paper Tape   [] Other:    Avoid contact of tape with skin if possible. [x] Change dressing: [x] Daily    [] Every Other Day [] Three times per week   [] Once a week [] Do Not Change Dressing   [] Other:       Edema Control:  Apply: [] Compression Stocking []Right Leg []Left Leg     [] Double-Layer MediGrip/Spandagrip []Right Leg  [x]Left Leg      []Low compression 5-10 mm/Hg      [x]Medium compression 10-20 mm/Hg     []High compression  20-30 mm/Hg    Apply every morning immediately when getting up. They should be applied to affected leg(s) from mid foot to knee making sure to cover the heel. Remove every night before going to bed. [x] Elevate leg(s) above the level of the heart for 30 minutes 4-5 times a day and/or when sitting. [x] Avoid prolonged standing in one place.         Off-Loading:   [] Off-loading when: [] walking  [] in bed [] sitting    [] Total non-weight bearing:  [] Right Leg  [] Left Leg     [] Partial weight bearing:   [] Right Leg  [] Left Leg    [x] No weight bearing restrictions (May participate in physical therapy)    [x] Assistive Device: [] Christian Goddard [] Crutches  [] Wheelchair [] Roll About   [] Surgical shoe/Darco    [] Podus Boot(s)   [x] Prevalon Boot(s)  [] Manisha Dhillon    [] [employer] [] Other:    Dietary:  Important dietary reminders:  1. Increase Protein intake (i.e. Lean meats, fish, eggs, legumes, and yogurt)  2. No added salt  3. If diabetic, follow a diabetic diet and check glucose prior to meals or as instructed by your physician. Return Appointment:  [] Wound and dressing supply provider:   [x] ECF or Home Healthcare: [facility] [phone]  Fax: [phone]    [] Nurse visit:    [x] Return Appointment: With Dr Carloz Browne  in  [facility])    [] Ordered tests:       Referrals: (see attached referral)    [] Weight Management [] Diabetes Education [] Vascular Surgery      [] Endocrinology  [] Nutrition Counseling  [] Lymphedema Therapy                  [] Plastic & Rescontruction Surgery    Your nurse  is:   Qiana Leyva     Electronically signed by Debra Flores RN on 3/27/2018 at 2:08 [address]: Should you experience any significant changes in your wound(s) or have questions about your wound care, please contact the [address] [place] [address] at [phone] Monday-Friday from 8:00 am - 4:30 pm except for Wednesdays which hours are from 8:00 am - 2:00 pm.   If you need help with your wound outside these hours and cannot wait until we are again available, contact your PCP or go to the hospital emergency room. PLEASE NOTE: IF YOU ARE UNABLE TO OBTAIN WOUND SUPPLIES, CONTINUE TO USE THE SUPPLIES YOU HAVE AVAILABLE UNTIL YOU ARE ABLE TO REACH US. IT IS MOST IMPORTANT TO KEEP THE WOUND COVERED AT ALL TIMES.       Physician orders by:     [x] Dr Chandler Lima [] Dr Ara Pillai    [] Dr Leo Santos     [] Dr Monika Mcnamara   [] Dr Severino Ron  [] Dr Mackenzie Ingram  [] Dr Susanne Najera Aguilar Claros       Physician Signature:__________________________________        The information contained in the After Visit Summary has been reviewed with me, the patient and/or responsible adult, by my health care provider(s). I had the opportunity to ask questions regarding this information.   I have elected to receive;      [] Patient unable to sign Discharge Instructions given to ECF/Transportation/POA        Electronically signed by John Hamilton DPM on 3/27/2018 at 2:48 PM

## 2018-04-03 ENCOUNTER — OFFICE VISIT (OUTPATIENT)
Dept: VASCULAR SURGERY | Age: 53
End: 2018-04-03

## 2018-04-03 ENCOUNTER — TELEPHONE (OUTPATIENT)
Dept: SURGERY | Age: 53
End: 2018-04-03

## 2018-04-03 VITALS — SYSTOLIC BLOOD PRESSURE: 130 MMHG | DIASTOLIC BLOOD PRESSURE: 61 MMHG | HEART RATE: 115 BPM | TEMPERATURE: 98.2 F

## 2018-04-03 DIAGNOSIS — L97.414 DIABETIC ULCER OF RIGHT HEEL ASSOCIATED WITH TYPE 2 DIABETES MELLITUS, WITH NECROSIS OF BONE (HCC): Primary | ICD-10-CM

## 2018-04-03 DIAGNOSIS — E11.621 DIABETIC ULCER OF RIGHT HEEL ASSOCIATED WITH TYPE 2 DIABETES MELLITUS, WITH NECROSIS OF BONE (HCC): Primary | ICD-10-CM

## 2018-04-03 PROCEDURE — 99024 POSTOP FOLLOW-UP VISIT: CPT | Performed by: SURGERY

## 2018-04-04 DIAGNOSIS — M86.179 OTHER ACUTE OSTEOMYELITIS OF FOOT, UNSPECIFIED LATERALITY (HCC): Primary | ICD-10-CM

## 2018-04-07 PROBLEM — E16.2 HYPOGLYCEMIA: Status: ACTIVE | Noted: 2018-04-07

## 2018-04-10 ENCOUNTER — HOSPITAL ENCOUNTER (OUTPATIENT)
Dept: WOUND CARE | Age: 53
Discharge: HOME | End: 2018-04-10
Attending: PODIATRIST | Admitting: PODIATRIST

## 2018-04-10 VITALS
HEART RATE: 100 BPM | DIASTOLIC BLOOD PRESSURE: 67 MMHG | SYSTOLIC BLOOD PRESSURE: 130 MMHG | TEMPERATURE: 98.4 F | RESPIRATION RATE: 18 BRPM

## 2018-04-10 LAB
GLUCOSE BLD-MCNC: 154 MG/DL (ref 70–99)
PERFORMED ON: ABNORMAL

## 2018-04-10 RX ORDER — ACETAMINOPHEN 325 MG/1
650 TABLET ORAL EVERY 4 HOURS PRN
COMMUNITY
End: 2018-06-07 | Stop reason: SDUPTHER

## 2018-04-10 RX ORDER — LIDOCAINE HYDROCHLORIDE 40 MG/ML
SOLUTION TOPICAL ONCE
Status: COMPLETED | OUTPATIENT
Start: 2018-04-10 | End: 2018-04-10

## 2018-04-10 RX ADMIN — LIDOCAINE HYDROCHLORIDE 2.5 ML: 40 SOLUTION TOPICAL at 13:45

## 2018-04-17 ENCOUNTER — OFFICE VISIT (OUTPATIENT)
Dept: VASCULAR SURGERY | Age: 53
End: 2018-04-17

## 2018-04-17 VITALS
SYSTOLIC BLOOD PRESSURE: 170 MMHG | DIASTOLIC BLOOD PRESSURE: 79 MMHG | HEART RATE: 113 BPM | WEIGHT: 175 LBS | BODY MASS INDEX: 23.09 KG/M2 | OXYGEN SATURATION: 100 % | TEMPERATURE: 98.2 F

## 2018-04-17 DIAGNOSIS — I73.9 PVD (PERIPHERAL VASCULAR DISEASE) (HCC): Primary | ICD-10-CM

## 2018-04-17 PROCEDURE — 99024 POSTOP FOLLOW-UP VISIT: CPT | Performed by: SURGERY

## 2018-04-24 ENCOUNTER — TELEPHONE (OUTPATIENT)
Dept: WOUND CARE | Age: 53
End: 2018-04-24

## 2018-05-01 ENCOUNTER — OFFICE VISIT (OUTPATIENT)
Dept: VASCULAR SURGERY | Age: 53
End: 2018-05-01

## 2018-05-01 VITALS — SYSTOLIC BLOOD PRESSURE: 149 MMHG | DIASTOLIC BLOOD PRESSURE: 65 MMHG | HEART RATE: 108 BPM | TEMPERATURE: 98.2 F

## 2018-05-01 DIAGNOSIS — I73.9 PAD (PERIPHERAL ARTERY DISEASE) (HCC): Primary | ICD-10-CM

## 2018-05-01 PROCEDURE — 99024 POSTOP FOLLOW-UP VISIT: CPT | Performed by: SURGERY

## 2018-05-08 ENCOUNTER — HOSPITAL ENCOUNTER (OUTPATIENT)
Dept: WOUND CARE | Age: 53
Discharge: HOME | End: 2018-05-08
Attending: PODIATRIST | Admitting: PODIATRIST

## 2018-05-08 VITALS
RESPIRATION RATE: 18 BRPM | SYSTOLIC BLOOD PRESSURE: 113 MMHG | DIASTOLIC BLOOD PRESSURE: 57 MMHG | TEMPERATURE: 98.2 F | HEART RATE: 95 BPM

## 2018-05-08 DIAGNOSIS — L97.212 NON-PRESSURE CHRONIC ULCER OF RIGHT CALF WITH FAT LAYER EXPOSED (HCC): ICD-10-CM

## 2018-05-08 DIAGNOSIS — L97.422 ULCER OF LEFT HEEL, WITH FAT LAYER EXPOSED (HCC): Primary | ICD-10-CM

## 2018-05-08 RX ORDER — LIDOCAINE HYDROCHLORIDE 40 MG/ML
2.5 SOLUTION TOPICAL ONCE
Status: COMPLETED | OUTPATIENT
Start: 2018-05-08 | End: 2018-05-08

## 2018-05-08 RX ADMIN — LIDOCAINE HYDROCHLORIDE 2.5 ML: 40 SOLUTION TOPICAL at 14:02

## 2018-05-10 PROBLEM — L97.212 NON-PRESSURE CHRONIC ULCER OF RIGHT CALF WITH FAT LAYER EXPOSED (HCC): Status: ACTIVE | Noted: 2018-05-10

## 2018-05-21 ENCOUNTER — HOSPITAL ENCOUNTER (OUTPATIENT)
Dept: WOUND CARE | Age: 53
Discharge: HOME | End: 2018-05-21
Admitting: PODIATRIST

## 2018-05-21 VITALS
RESPIRATION RATE: 18 BRPM | SYSTOLIC BLOOD PRESSURE: 133 MMHG | HEART RATE: 109 BPM | DIASTOLIC BLOOD PRESSURE: 69 MMHG | TEMPERATURE: 98.3 F

## 2018-05-21 DIAGNOSIS — T81.31XA DEHISCENCE OF OPERATIVE WOUND, INITIAL ENCOUNTER: ICD-10-CM

## 2018-05-21 DIAGNOSIS — I73.9 PVD (PERIPHERAL VASCULAR DISEASE) (HCC): Primary | ICD-10-CM

## 2018-05-21 PROCEDURE — 99024 POSTOP FOLLOW-UP VISIT: CPT | Performed by: SURGERY

## 2018-05-21 RX ORDER — POLYETHYLENE GLYCOL 3350 17 G/17G
17 POWDER, FOR SOLUTION ORAL DAILY
COMMUNITY
End: 2018-06-07 | Stop reason: SDUPTHER

## 2018-05-29 ENCOUNTER — HOSPITAL ENCOUNTER (OUTPATIENT)
Dept: WOUND CARE | Age: 53
Discharge: HOME | End: 2018-05-29
Attending: PODIATRIST | Admitting: PODIATRIST

## 2018-05-29 VITALS
RESPIRATION RATE: 20 BRPM | SYSTOLIC BLOOD PRESSURE: 133 MMHG | DIASTOLIC BLOOD PRESSURE: 86 MMHG | HEART RATE: 103 BPM | TEMPERATURE: 98.5 F

## 2018-05-29 DIAGNOSIS — L97.422 ULCER OF LEFT HEEL, WITH FAT LAYER EXPOSED (HCC): Primary | ICD-10-CM

## 2018-05-29 RX ORDER — LIDOCAINE HYDROCHLORIDE 40 MG/ML
2.5 SOLUTION TOPICAL ONCE
Status: COMPLETED | OUTPATIENT
Start: 2018-05-29 | End: 2018-05-29

## 2018-05-29 RX ADMIN — LIDOCAINE HYDROCHLORIDE 2.5 ML: 40 SOLUTION TOPICAL at 13:15

## 2018-05-29 ASSESSMENT — PAIN DESCRIPTION - LOCATION: LOCATION: LEG

## 2018-05-29 ASSESSMENT — PAIN SCALES - GENERAL: PAINLEVEL_OUTOF10: 8

## 2018-05-29 ASSESSMENT — PAIN DESCRIPTION - FREQUENCY: FREQUENCY: CONTINUOUS

## 2018-05-29 ASSESSMENT — PAIN DESCRIPTION - PAIN TYPE: TYPE: ACUTE PAIN

## 2018-05-29 ASSESSMENT — PAIN DESCRIPTION - PROGRESSION: CLINICAL_PROGRESSION: NOT CHANGED

## 2018-05-29 ASSESSMENT — PAIN DESCRIPTION - ORIENTATION: ORIENTATION: LEFT

## 2018-05-29 ASSESSMENT — PAIN DESCRIPTION - DESCRIPTORS: DESCRIPTORS: SHARP

## 2018-05-29 ASSESSMENT — PAIN DESCRIPTION - ONSET: ONSET: ON-GOING

## 2018-06-05 ENCOUNTER — TELEPHONE (OUTPATIENT)
Dept: WOUND CARE | Age: 53
End: 2018-06-05

## 2018-06-07 ENCOUNTER — OFFICE VISIT (OUTPATIENT)
Dept: INTERNAL MEDICINE | Age: 53
End: 2018-06-07
Attending: STUDENT IN AN ORGANIZED HEALTH CARE EDUCATION/TRAINING PROGRAM

## 2018-06-07 VITALS
DIASTOLIC BLOOD PRESSURE: 72 MMHG | HEART RATE: 100 BPM | SYSTOLIC BLOOD PRESSURE: 148 MMHG | BODY MASS INDEX: 22.82 KG/M2 | OXYGEN SATURATION: 99 % | TEMPERATURE: 99 F | WEIGHT: 173 LBS

## 2018-06-07 DIAGNOSIS — E10.8 TYPE 1 DIABETES MELLITUS WITH COMPLICATION (HCC): ICD-10-CM

## 2018-06-07 LAB
BILIRUBIN URINE: NEGATIVE MG/DL
BLOOD, URINE: ABNORMAL
CLARITY: ABNORMAL
COLOR: ABNORMAL
GLUCOSE URINE: 500 MG/DL
KETONES, URINE: NEGATIVE MG/DL
LEUKOCYTE ESTERASE, URINE: NEGATIVE
MICROSCOPIC EXAMINATION: YES
NITRITE, URINE: NEGATIVE
PH UA: 5.5
PROTEIN UA: 30 MG/DL
SPECIFIC GRAVITY UA: 1.01
UROBILINOGEN, URINE: 0.2 E.U./DL

## 2018-06-07 RX ORDER — MULTIVIT-MIN/IRON FUM/FOLIC AC 7.5 MG-4
1 TABLET ORAL DAILY
Qty: 30 TABLET | Refills: 3 | Status: SHIPPED | OUTPATIENT
Start: 2018-06-07

## 2018-06-07 RX ORDER — NIFEDIPINE 90 MG/1
90 TABLET, EXTENDED RELEASE ORAL DAILY
Qty: 30 TABLET | Refills: 3 | Status: SHIPPED | OUTPATIENT
Start: 2018-06-07 | End: 2018-08-22 | Stop reason: DRUGHIGH

## 2018-06-07 RX ORDER — ATORVASTATIN CALCIUM 80 MG/1
80 TABLET, FILM COATED ORAL DAILY
Qty: 30 TABLET | Refills: 3 | Status: SHIPPED | OUTPATIENT
Start: 2018-06-07

## 2018-06-07 RX ORDER — GABAPENTIN 300 MG/1
300 CAPSULE ORAL 3 TIMES DAILY
Qty: 90 CAPSULE | Refills: 3 | Status: SHIPPED | OUTPATIENT
Start: 2018-06-07 | End: 2018-08-22 | Stop reason: SDUPTHER

## 2018-06-07 RX ORDER — CARVEDILOL 25 MG/1
25 TABLET ORAL 2 TIMES DAILY WITH MEALS
Qty: 60 TABLET | Refills: 1 | Status: SHIPPED | OUTPATIENT
Start: 2018-06-07

## 2018-06-07 RX ORDER — LISINOPRIL 10 MG/1
10 TABLET ORAL DAILY
Qty: 30 TABLET | Refills: 3 | Status: SHIPPED | OUTPATIENT
Start: 2018-06-07

## 2018-06-07 RX ORDER — SENNA PLUS 8.6 MG/1
1 TABLET ORAL DAILY PRN
Qty: 30 TABLET | Refills: 3 | Status: SHIPPED | OUTPATIENT
Start: 2018-06-07

## 2018-06-07 RX ORDER — ACETAMINOPHEN 325 MG/1
650 TABLET ORAL EVERY 4 HOURS PRN
Qty: 120 TABLET | Refills: 3 | Status: SHIPPED | OUTPATIENT
Start: 2018-06-07

## 2018-06-07 RX ORDER — HYDRALAZINE HYDROCHLORIDE 100 MG/1
100 TABLET, FILM COATED ORAL EVERY 8 HOURS SCHEDULED
Qty: 90 TABLET | Refills: 3 | Status: SHIPPED | OUTPATIENT
Start: 2018-06-07 | End: 2018-09-19 | Stop reason: SDUPTHER

## 2018-06-07 RX ORDER — CLOPIDOGREL BISULFATE 75 MG/1
75 TABLET ORAL DAILY
Qty: 30 TABLET | Refills: 3 | Status: SHIPPED | OUTPATIENT
Start: 2018-06-07

## 2018-06-07 RX ORDER — POLYETHYLENE GLYCOL 3350 17 G/17G
17 POWDER, FOR SOLUTION ORAL DAILY
Qty: 7 EACH | Refills: 3 | Status: SHIPPED | OUTPATIENT
Start: 2018-06-07

## 2018-06-12 ENCOUNTER — TELEPHONE (OUTPATIENT)
Dept: WOUND CARE | Age: 53
End: 2018-06-12

## 2018-06-19 ENCOUNTER — HOSPITAL ENCOUNTER (OUTPATIENT)
Dept: WOUND CARE | Age: 53
Discharge: HOME | End: 2018-06-19
Attending: PODIATRIST | Admitting: PODIATRIST

## 2018-06-19 VITALS
HEART RATE: 104 BPM | SYSTOLIC BLOOD PRESSURE: 129 MMHG | DIASTOLIC BLOOD PRESSURE: 67 MMHG | RESPIRATION RATE: 20 BRPM | TEMPERATURE: 98.5 F

## 2018-06-19 LAB
GLUCOSE BLD-MCNC: 173 MG/DL (ref 70–99)
PERFORMED ON: ABNORMAL

## 2018-06-19 RX ORDER — LIDOCAINE HYDROCHLORIDE 40 MG/ML
2.5 SOLUTION TOPICAL ONCE
Status: COMPLETED | OUTPATIENT
Start: 2018-06-19 | End: 2018-06-19

## 2018-06-19 RX ADMIN — LIDOCAINE HYDROCHLORIDE 2.5 ML: 40 SOLUTION TOPICAL at 14:20

## 2018-06-19 ASSESSMENT — PAIN DESCRIPTION - DESCRIPTORS: DESCRIPTORS: SHARP

## 2018-06-19 ASSESSMENT — PAIN DESCRIPTION - PAIN TYPE: TYPE: CHRONIC PAIN

## 2018-06-19 ASSESSMENT — PAIN DESCRIPTION - ORIENTATION: ORIENTATION: LEFT

## 2018-06-19 ASSESSMENT — PAIN SCALES - GENERAL: PAINLEVEL_OUTOF10: 4

## 2018-06-19 ASSESSMENT — PAIN DESCRIPTION - LOCATION: LOCATION: LEG

## 2018-06-22 ENCOUNTER — TELEPHONE (OUTPATIENT)
Dept: SURGERY | Age: 53
End: 2018-06-22

## 2018-06-22 NOTE — TELEPHONE ENCOUNTER
Called Nasim with [facility] (patient is working with [employer]). Monalisa Peñaloza will follow thru with this request, and will be notifying Trudi Richey that the Physical Rehab doctor would order the PT. Monalisa Jh given [employer] and fax number.

## 2018-08-22 ENCOUNTER — OFFICE VISIT (OUTPATIENT)
Dept: INTERNAL MEDICINE CLINIC | Age: 53
End: 2018-08-22
Payer: COMMERCIAL

## 2018-08-22 VITALS
RESPIRATION RATE: 20 BRPM | OXYGEN SATURATION: 99 % | BODY MASS INDEX: 22.43 KG/M2 | WEIGHT: 170 LBS | TEMPERATURE: 98.5 F | HEART RATE: 102 BPM | SYSTOLIC BLOOD PRESSURE: 106 MMHG | DIASTOLIC BLOOD PRESSURE: 66 MMHG

## 2018-08-22 DIAGNOSIS — I10 ESSENTIAL HYPERTENSION: Primary | ICD-10-CM

## 2018-08-22 DIAGNOSIS — E11.42 TYPE 2 DIABETES MELLITUS WITH DIABETIC POLYNEUROPATHY, WITH LONG-TERM CURRENT USE OF INSULIN (HCC): ICD-10-CM

## 2018-08-22 DIAGNOSIS — E78.5 DYSLIPIDEMIA: ICD-10-CM

## 2018-08-22 DIAGNOSIS — R26.81 GAIT INSTABILITY: ICD-10-CM

## 2018-08-22 DIAGNOSIS — Z79.4 TYPE 2 DIABETES MELLITUS WITH DIABETIC POLYNEUROPATHY, WITH LONG-TERM CURRENT USE OF INSULIN (HCC): ICD-10-CM

## 2018-08-22 LAB
GLUCOSE BLD-MCNC: 164 MG/DL (ref 70–99)
PERFORMED ON: ABNORMAL

## 2018-08-22 PROCEDURE — 99213 OFFICE O/P EST LOW 20 MIN: CPT | Performed by: STUDENT IN AN ORGANIZED HEALTH CARE EDUCATION/TRAINING PROGRAM

## 2018-08-22 RX ORDER — NIFEDIPINE 60 MG/1
60 TABLET, EXTENDED RELEASE ORAL DAILY
Qty: 30 TABLET | Refills: 3 | Status: SHIPPED | OUTPATIENT
Start: 2018-08-22

## 2018-08-22 RX ORDER — BLOOD-GLUCOSE METER
KIT MISCELLANEOUS
Qty: 1 KIT | Refills: 0 | Status: SHIPPED | OUTPATIENT
Start: 2018-08-22

## 2018-08-22 RX ORDER — GABAPENTIN 300 MG/1
300 CAPSULE ORAL 3 TIMES DAILY
Qty: 183 CAPSULE | Refills: 0 | Status: SHIPPED | OUTPATIENT
Start: 2018-08-22 | End: 2018-09-19 | Stop reason: SDUPTHER

## 2018-08-22 ASSESSMENT — ENCOUNTER SYMPTOMS
NAUSEA: 0
COUGH: 0
WHEEZING: 0
ORTHOPNEA: 0
HEMOPTYSIS: 0
CONSTIPATION: 0
DIARRHEA: 0
VOMITING: 0
DOUBLE VISION: 0
BLURRED VISION: 0

## 2018-08-22 NOTE — PROGRESS NOTES
glargine (LANTUS) 100 UNIT/ML injection pen Inject 15 Units into the skin nightly 6/7/18   Marbin Saleem MD   insulin lispro (HUMALOG) 100 UNIT/ML pen Inject 5 Units into the skin 3 times daily (with meals) 6/7/18   Marbin Saleem MD   NIFEdipine (PROCARDIA XL) 90 MG extended release tablet Take 1 tablet by mouth daily 6/7/18   Marbin Saelem MD   hydrALAZINE (APRESOLINE) 100 MG tablet Take 1 tablet by mouth every 8 hours 6/7/18   Marbin Saleem MD   senna (SENOKOT) 8.6 MG tablet Take 1 tablet by mouth daily as needed for Constipation 6/7/18   Marbin Saleem MD   carvedilol (COREG) 25 MG tablet Take 1 tablet by mouth 2 times daily (with meals) 6/7/18   Marbin Saleem MD   Multiple Vitamins-Minerals (MULTIVITAMIN WITH MINERALS) tablet Take 1 tablet by mouth daily 6/7/18   Marbin Saleem MD   apixaban (ELIQUIS) 5 MG TABS tablet Take 1 tablet by mouth 2 times daily 6/7/18   Marbin Saleem MD   atorvastatin (LIPITOR) 80 MG tablet Take 1 tablet by mouth daily 6/7/18   Marbin Saleem MD   clopidogrel (PLAVIX) 75 MG tablet Take 1 tablet by mouth daily 6/7/18   Marbin Saleem MD   gabapentin (NEURONTIN) 300 MG capsule Take 1 capsule by mouth 3 times daily for 61 days. . 6/7/18 8/7/18  Marbin Saleem MD   lisinopril (PRINIVIL;ZESTRIL) 10 MG tablet Take 1 tablet by mouth daily 6/7/18   Marbin Saleem MD   oxyCODONE (OXYCONTIN) 10 MG extended release tablet Take 10 mg by mouth every 12 hours. Historical Provider, MD       REVIEW OF SYSTEMS:  Review of Systems   Constitutional: Negative. HENT: Negative for hearing loss. Eyes: Negative for blurred vision and double vision. Respiratory: Negative for cough, hemoptysis and wheezing. Cardiovascular: Negative for chest pain, palpitations, orthopnea, leg swelling and PND. Gastrointestinal: Negative for constipation, diarrhea, nausea and vomiting. Genitourinary: Negative for dysuria, frequency and urgency. Musculoskeletal: Positive for joint pain (L knee pain). Skin: Negative. 10.3 (10/11/2017) -> 10.9 (2/25/18)  - Recheck A1C pending  - Sees Podiatry for DFE, f/u Dr. Miley Robbins and sees vascular for CKA  - Needs eye exam   - Urine microalbumin pending  - CMP pending  - Ordered new glucomenter     HTN  - pt compliant with medications; BP today 106/66  - continue coreg 25mg BID  - continue lisinopril 10mg daily  - continue hydralazine 100mg TID  - nifedipine dose changed from 90mg to 60mg  - 2 wk f/u to check BP; will reassess Rx and deescalate nifedipine and hydralazine based on BP    HFpEF, grade I diastolic dysfunction (LVEF 65% 07/18/2017)  - no LE edema  - continue compression stockings     HLD  - continue atorvastatin 80mg  - LDL pending     PAD  - continue plavix 75mg daily    Knee Pain  - now in wheel chair, has knee brace  - tylenol prn  - sees Dr. Miley Robbins and Dr. Dhaliwal Stage  - ordered PT  - reordered gabapentin; no narcotics given      Hx of PE  - continue eliquis 5mg BID     Anemia of chronic disease  - CBC pending     Health Maintenance  - Still needs colonoscopy did not discuss this visit ! ! (was referred before)  - had PCV23 in 2014  - continue multivitamin     Case will be discussed with preceptor. Dr. Alex Savage M.D.   8/22/2018, 1:31 PM   Pager [phone]    Addendum to Resident H& P/Progress note:  I have personally seen,examined and evaluated the patient.  I have reviewed the current history, physical findings, labs and assessment and plan and agree with note as documented by resident MD ( Monika Or)      Edward Wiseman MD, [address]

## 2018-09-19 ENCOUNTER — OFFICE VISIT (OUTPATIENT)
Dept: INTERNAL MEDICINE CLINIC | Age: 53
End: 2018-09-19
Payer: COMMERCIAL

## 2018-09-19 VITALS
SYSTOLIC BLOOD PRESSURE: 101 MMHG | OXYGEN SATURATION: 99 % | HEIGHT: 73 IN | BODY MASS INDEX: 22.43 KG/M2 | TEMPERATURE: 98.5 F | DIASTOLIC BLOOD PRESSURE: 56 MMHG | RESPIRATION RATE: 20 BRPM | HEART RATE: 92 BPM

## 2018-09-19 DIAGNOSIS — I10 ESSENTIAL HYPERTENSION: ICD-10-CM

## 2018-09-19 DIAGNOSIS — Z89.9 S/P AMPUTATION: ICD-10-CM

## 2018-09-19 PROCEDURE — 99213 OFFICE O/P EST LOW 20 MIN: CPT | Performed by: STUDENT IN AN ORGANIZED HEALTH CARE EDUCATION/TRAINING PROGRAM

## 2018-09-19 RX ORDER — HYDRALAZINE HYDROCHLORIDE 100 MG/1
50 TABLET, FILM COATED ORAL EVERY 8 HOURS SCHEDULED
Qty: 90 TABLET | Refills: 3 | Status: SHIPPED | OUTPATIENT
Start: 2018-09-19

## 2018-09-19 RX ORDER — GABAPENTIN 300 MG/1
300 CAPSULE ORAL 4 TIMES DAILY
Qty: 244 CAPSULE | Refills: 0 | Status: SHIPPED | OUTPATIENT
Start: 2018-09-19 | End: 2018-11-19

## 2018-09-19 ASSESSMENT — ENCOUNTER SYMPTOMS
CONSTIPATION: 0
DOUBLE VISION: 1
EYE PAIN: 0
BLURRED VISION: 0
BACK PAIN: 0
DIARRHEA: 0
ABDOMINAL PAIN: 0
ORTHOPNEA: 0
EYE DISCHARGE: 0
HEMOPTYSIS: 0
COUGH: 0

## 2018-09-19 NOTE — PROGRESS NOTES
Department Of Internal Medicine  General Medicine/Primary Care  Established Patient Visit    Patient:  Kayla Delacruz                                               : 1965  Age: 46 y.o. MRN: [id number]  Date : 2018    History Obtained From:  patient, electronic medical record    REASON FOR VISIT:  Follow up for BP medication de-escalation    HISTORY OF PRESENT ILLNESS:   The patient is a 46 y.o. male who presents for BP medication de-escalation. HE continues to be compliant with his home medications. He continues to complain of L hip and knee pain. He denies any dizziness, N/V/F/C, CP, SOB, constipation and diarrhea. He states that his BG are  and spike when he is not compliant with his diet. Otherwise he denies new changes with his vision and states that he does not have any blurry vision.        Past Medical History:        Diagnosis Date    Clostridium difficile diarrhea 2017    PCR    Hyperlipidemia     Hypertension     MDRO (multiple drug resistant organisms) resistance 2017    leg    MRSA (methicillin resistant staph aureus) culture positive 06/15/2016    foot; bone; blood; chest abscess    MRSA (methicillin resistant staph aureus) culture positive 2017    foot    Osteomyelitis of left foot ([facility] [address]     Type II or unspecified type diabetes mellitus without mention of complication, not stated as uncontrolled     VRE (vancomycin resistant enterococcus) culture positive 8/25/15    foot       Past Surgical History:        Procedure Laterality Date    ABSCESS DRAINAGE Left 2016    Dr. Bebeto Levy - chest wall, I&D of phlegmon, placement of wound vac    FEMORAL-TIBIAL BYPASS GRAFT Left 2015    FIRST RIB REMOVAL Left 2016    Dr. Bebeto Levy - via trans-axillary approach    FOOT DEBRIDEMENT Left 2015    FOOT DEBRIDEMENT Left 2015    w/delayed primary closure    FOOT SURGERY Left 2015    INCISION AND DRAINAGE LEFT FOOT WITH DEBRIDEMENT    FOOT SURGERY Left 08/11/2015    TRANSMETATARSAL OSTEOTOMY LEFT FOOT       FOOT SURGERY Left 12/07/2015    INCISION AND DRAINAGE LEFT FOOT TO BONE AND CORTEX WITH    LEG AMPUTATION BELOW KNEE Right 03/07/2018    RIGHT LEG BELOW KNEE AMPUTATION           OTHER SURGICAL HISTORY  4/10/15    LEFT SUPERFICIAL FEMORAL ARTERY TO POSTERIOR TIBIAL ARTERY IN    OTHER SURGICAL HISTORY Left 5/11/215    INCISION AND DEBRIDEMENT LEFT INNER THIGH WOUND AND LEFT CALF    OTHER SURGICAL HISTORY  5/19/2015    INCISION AND DRAINAGE WITH REMOVAL OF NECROTIC SOFT TISSUE    OTHER SURGICAL HISTORY  07/03/2016    FORMAL PARTIAL 1ST RAY AMPUTATION, INCISION AND DRAINAGE DOWN    THROMBOENDARTERECTOMY Left 9/9/2015    POPLITEAL TO TIBIAL ENDARETCTOMY; ABORTED FEM- POP BYPASS    TOE AMPUTATION  09-    left great toe amputation       Family History:   Family History   Problem Relation Age of Onset    High Blood Pressure Mother     Diabetes Mother     Diabetes Sister     Diabetes Brother        Social History:   TOBACCO:   reports that he has quit smoking. His smoking use included Cigars. He started smoking about 7 weeks ago. He has never used smokeless tobacco.  ETOH:   reports that he drinks about 3.6 oz of alcohol per week . OCCUPATION:      Allergies: Toradol [ketorolac tromethamine]    Current Medications:    Prior to Admission medications    Medication Sig Start Date End Date Taking? Authorizing Provider   glucose monitoring kit (FREESTYLE) monitoring kit E11.9 may substitute according to patient's insurance 8/22/18  Yes Cb Allan MD   NIFEdipine (NIFEDICAL XL) 60 MG extended release tablet Take 1 tablet by mouth daily 8/22/18  Yes Cb Allan MD   gabapentin (NEURONTIN) 300 MG capsule Take 1 capsule by mouth 3 times daily for 61 days. . 8/22/18 10/22/18 Yes Cb Allan MD   insulin aspart (NOVOLOG) 100 UNIT/ML injection vial Inject 5 Units into the skin 3 times daily (before meals) sliding scale 0-299=0;   300-450=3    Greater than 300 give 3 units in addition to routine insulin 6/8/18  Yes Cade Correa MD   polyethylene glycol [facility]) PACK packet Take 17 g by mouth daily 6/7/18  Yes Cade Correa MD   acetaminophen (TYLENOL) 325 MG tablet Take 2 tablets by mouth every 4 hours as needed for Pain 6/7/18  Yes Cade Correa MD   insulin glargine (LANTUS) 100 UNIT/ML injection pen Inject 15 Units into the skin nightly  Patient taking differently: Inject 15 Units into the skin nightly patient is now taking 16 units at night 6/7/18  Yes Cade Correa MD   insulin lispro (HUMALOG) 100 UNIT/ML pen Inject 5 Units into the skin 3 times daily (with meals) 6/7/18  Yes Cade Correa MD   hydrALAZINE (APRESOLINE) 100 MG tablet Take 1 tablet by mouth every 8 hours 6/7/18  Yes Cade Correa MD   senna (SENOKOT) 8.6 MG tablet Take 1 tablet by mouth daily as needed for Constipation 6/7/18  Yes Cade Correa MD   carvedilol (COREG) 25 MG tablet Take 1 tablet by mouth 2 times daily (with meals) 6/7/18  Yes Cade Correa MD   Multiple Vitamins-Minerals (MULTIVITAMIN WITH MINERALS) tablet Take 1 tablet by mouth daily 6/7/18  Yes Cade Correa MD   apixaban (ELIQUIS) 5 MG TABS tablet Take 1 tablet by mouth 2 times daily 6/7/18  Yes Cade Correa MD   atorvastatin (LIPITOR) 80 MG tablet Take 1 tablet by mouth daily 6/7/18  Yes Cade Correa MD   clopidogrel (PLAVIX) 75 MG tablet Take 1 tablet by mouth daily 6/7/18  Yes Cade Correa MD   lisinopril (PRINIVIL;ZESTRIL) 10 MG tablet Take 1 tablet by mouth daily 6/7/18  Yes Cade Correa MD   oxyCODONE (OXYCONTIN) 10 MG extended release tablet Take 10 mg by mouth every 12 hours. Yes Historical Provider, MD       Review of Systems   Constitutional: Negative for chills, diaphoresis, fever, malaise/fatigue and weight loss. HENT: Negative for congestion, ear discharge, ear pain and hearing loss. Eyes: Positive for double vision. Negative for blurred vision, pain and discharge.    Respiratory:

## 2018-10-03 DIAGNOSIS — Z89.9 S/P AMPUTATION: Primary | ICD-10-CM

## 2018-10-16 ENCOUNTER — HOSPITAL ENCOUNTER (OUTPATIENT)
Dept: PHYSICAL THERAPY | Age: 53
Setting detail: THERAPIES SERIES
Discharge: HOME | End: 2018-10-16
Payer: COMMERCIAL

## 2018-10-16 PROCEDURE — 97162 PT EVAL MOD COMPLEX 30 MIN: CPT

## 2018-10-16 PROCEDURE — G8978 MOBILITY CURRENT STATUS: HCPCS

## 2018-10-16 PROCEDURE — G8979 MOBILITY GOAL STATUS: HCPCS

## 2018-10-16 NOTE — FLOWSHEET NOTE
Physical Therapy Daily Treatment Note- See PT EVAL  Date:  10/16/2018    Patient Name:  Annie Steel    :  1965  MRN: [id number]  Restrictions/Precautions:  Unstable DM I  Medical/Treatment Diagnosis Information:  · Diagnosis: Z89.9 (ICD-10-CM) - S/P amputation  ·  Impaired gait and transfers due to R BKA  Insurance/Certification information:  PT Insurance Information: Medicare, Tamera Ott  Physician Information:  Referring Practitioner: Dr. Niki Zaragoza MD Follow-up:   Plan of care signed (Y/N):    Visit# / total visits:    Pain level: 8-10/10   Progress Note: []  Yes  [x]  No  Next due by: Visit #16 or 18      Subjective:      Objective:  Observation:   Test measurements:      Exercises:  Exercise/Equipment Resistance/Repetitions Other comments                                                                           Other Therapeutic Activities:      Home Exercise Program:      Manual Treatments:      Modalities:      Timed Code Treatment Minutes: Total Treatment Minutes:      Treatment/Activity Tolerance:  [x] Patient tolerated treatment well [] Patient limited by fatigue  [x] Patient limited by pain  [] Patient limited by other medical complications  [] Other:     Assessment:   Pt presents w/ significant B LE ROM deficits, B LE weakness, L hip and knee pain due to severe arthritis, impaired weight bearing tolerance on L LE and impaired balance. Pt is motivated to walk short distances. He will benefit from intensive stretching and strengthening program to protect the arthritic L LE joints and improve the function of both the R and L LE needed for potential gait training. If unable to tolerate functional standing and/or gait he may benefit from further orthopedic assessment of the arthritic joints.      Prognosis: [] Good [x] Fair  [] Poor    Patient Requires Follow-up: [x] Yes  [] No    Goals:  Short term goals  Time Frame for Short term goals: 4 weeks  Short term goal 1: Pt will

## 2018-10-16 NOTE — PLAN OF CARE
tone/strength):     [] Ultrasound  [x] Electrical Stimulation        [] Cervical Traction [] Lumbar Traction    ? [x] Cold/hotpack [] Iontophoresis   Other:      []          []        Assessment  Conditions Requiring Skilled Therapeutic Intervention: Body structures, Functions, Activity limitations: Decreased functional mobility ; Decreased ADL status; Decreased strength;Decreased balance;Decreased high-level IADLs;Decreased coordination;Decreased ROM  Pt presents w/ significant B LE ROM deficits, B LE weakness, L hip and knee pain due to severe arthritis, impaired weight bearing tolerance on L LE and impaired balance. Pt is motivated to walk short distances. He will benefit from intensive stretching and strengthening program to protect the arthritic L LE joints and improve the function of both the R and L LE needed for potential gait training. If unable to tolerate functional standing and/or gait he may benefit from further orthopedic assessment of the arthritic joints. Goals:  Short term goals  Time Frame for Short term goals: 4 weeks  Short term goal 1: Pt will demonstrate ability to perform HEP following instruction. Short term goal 2: Pt will improve B hip flex ROM to 90*, R knee flex to 95*, R knee ext to -8*, L knee ext to -15* to improve ability to perform sit to stand. Short term goal 3: Pt will improve sit to/from stand to mod I w/ RW. Long term goals  Time Frame for Long term goals : 8 weeks   Long term goal 1: Pt will improve 5 time sit to stand time to 20 sec to demonstrate improved functional mobility. Long term goal 2: Pt will improve B hip abd strength to 4/5 and B knee ext to 5/5 to improve gait mechanics. Long term goal 3: Pt will 150' w/ RW mod I.   Long term goal 4: Pt will improve TUG to  40 seconds w/ RW to demonstrate improved functional gait.    Patient Goals   Patient goals : to walk in apt., upstairs, get to his garage that his 300' from house and \"get up and slow

## 2018-10-16 NOTE — PROGRESS NOTES
Cognitive Deficits/Orientation: none  Communication Barriers: none  Dominant Hand: right    Pain Screening  Pain location: L hip and knee  Pain intensity:0-10/10  Pain quality: ache (knee) , sharp (hip and knee)  Pain increased with: weight bearing and standing, all hip and knee passive and active movement   Pain decreased with:rest, pain meds help ( neurontin, tylenol)   Pain duration/pattern:intermittent, local     Contributing personal factors/relevant co-morbidities impacting treatment/outcome: chronicity of symptoms 7+months, lacks experience with exercise- no regular specific exercise performed (poor form w/ 2 LE exercises he is performing) , limited understanding for medical condition, involvement of multiple joints- 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 Other:Wearing 5ply x 1 sock w/ R LE prosthesis. Reports he has fluctuating edema in R LE depending on diet and amount of insuline taken. Objective  Activity Limitations: ambulation- unable, standing brief and requires B UE support, transfers, sleep,   Participation Restrictions: work, grocery shopping (requires scooter), household chores,   UE WFL   ROM   MMT     Shoulder Right Left Right Left   Elevation          Flexion           Abduction           Extension           ER           IR           Scap.  Retraction           Elbow           Flexion           Extension           Wrist       Flexion       Extension       Supination       Pronation       Radial Deviation       Ulnar Deviation       Finger       Flexion       Extension       Abd/Add                        A / P ROM MMT     Hip Right Left Right Left   Flexion 83  88 5 5 crepitus   Extension           Abduction      2+  2-   Adduction      2+  2-   ER           IR           Knee           Flexion  82  92 4+ 5   Extension -12  -20 4- 4+ crepitus   Ankle   NA    DF    3-    PF    3-   EV    0   Inv    0   Trunk       Upper and impaired balance. Pt is motivated to walk short distances. He will benefit from intensive stretching and strengthening program to protect the arthritic L LE joints and improve the function of both the R and L LE needed for potential gait training. If unable to tolerate functional standing and/or gait he may benefit from further orthopedic assessment of the arthritic joints. Prognosis: Fair   Clinical Presentation:  evolving   Based on the sections above, the clinical decision making required for this  evaluation was moderate complexity. Reason for Severity Modifier: Tests: TUG and 5 times sit to stand, Objective Measures and Clinical Judgement  HEP reviewed and issued written instructions. Requires PT Follow Up: Yes  Plan   Times per week: 2  Plan weeks: 8  Current Treatment Recommendations: Strengthening, Balance Training, Functional Mobility Training, Gait Training, Neuromuscular Re-education, Home Exercise Program, Safety Education & Training          G-Code     Functional Assessment Tool Used TUG (81 sec w/ RW); 5 times sit to stand (50 sec)     Score     Functional Limitation Mobility: Walking and moving around     Mobility: Walking and Moving Around Current Status () At least 80 percent but less than 100 percent impaired, limited or restricted     Mobility: Walking and Moving Around Goal Status () At least 20 percent but less than 40 percent impaired, limited or restricted       Goals  Short term goals  Time Frame for Short term goals: 4 weeks  Short term goal 1: Pt will demonstrate ability to perform HEP following instruction. Short term goal 2: Pt will improve B hip flex ROM to 90*, R knee flex to 95*, R knee ext to -8*, L knee ext to -15* to improve ability to perform sit to stand. Short term goal 3: Pt will improve sit to/from stand to mod I w/ RW.    Long term goals  Time Frame for Long term goals : 8 weeks   Long term goal 1: Pt will improve 5 time sit to stand time to 20 sec to demonstrate improved functional mobility. Long term goal 2: Pt will improve B hip abd strength to 4/5 and B knee ext to 5/5 to improve gait mechanics. Long term goal 3: Pt will 150' w/ RW mod I.   Long term goal 4: Pt will improve TUG to  40 seconds w/ RW to demonstrate improved functional gait.    Patient Goals   Patient goals : to walk in apt., upstairs, get to his garage that his 300' from house and \"get up and slow dance\"       Therapy Time   Individual Concurrent Group Co-treatment   Time In  1130         Time Out  1230         Minutes  60                 Donte Wagner, PT, DPT

## 2018-10-22 RX ORDER — LANCETS
1 EACH MISCELLANEOUS
Qty: 100 EACH | Refills: 2
Start: 2018-10-22

## 2018-10-23 ENCOUNTER — HOSPITAL ENCOUNTER (OUTPATIENT)
Dept: PHYSICAL THERAPY | Age: 53
Setting detail: THERAPIES SERIES
Discharge: HOME | End: 2018-10-23
Payer: COMMERCIAL

## 2018-10-23 PROCEDURE — 97110 THERAPEUTIC EXERCISES: CPT

## 2018-10-23 NOTE — FLOWSHEET NOTE
Minutes:  60    Treatment/Activity Tolerance:  [x] Patient tolerated treatment well [] Patient limited by fatigue  [x] Patient limited by pain  [] Patient limited by other medical complications  [] Other:     Assessment:   Pt significantly limited in active movement at hip in all directions due to pain. He is motivated to push through pain as able, however educated pt on the need to listen to pain as indicator and exercises can be modified to his tolerance. Pt somewhat receptive. Encouraged pt to seek orthopedic evaluation of L hip and knee due to severity of crepitus and questionable stability of joints. He will benefit from intensive stretching and strengthening program to protect the arthritic L LE joints and improve the function of both the R and L LE needed for potential gait training. If unable to tolerate functional standing and/or gait he may benefit from further orthopedic assessment of the arthritic joints. Prognosis: [] Good [x] Fair  [] Poor    Patient Requires Follow-up: [x] Yes  [] No    Goals:  Short term goals  Time Frame for Short term goals: 4 weeks  Short term goal 1: Pt will demonstrate ability to perform HEP following instruction. Short term goal 2: Pt will improve B hip flex ROM to 90*, R knee flex to 95*, R knee ext to -8*, L knee ext to -15* to improve ability to perform sit to stand. Short term goal 3: Pt will improve sit to/from stand to mod I w/ RW. Long term goals  Time Frame for Long term goals : 8 weeks   Long term goal 1: Pt will improve 5 time sit to stand time to 20 sec to demonstrate improved functional mobility. Long term goal 2: Pt will improve B hip abd strength to 4/5 and B knee ext to 5/5 to improve gait mechanics. Long term goal 3: Pt will 150' w/ RW mod I.   Long term goal 4: Pt will improve TUG to  40 seconds w/ RW to demonstrate improved functional gait.    Patient Goals   Patient goals : to walk in apt., upstairs, get to his garage that his 300' from house

## 2018-10-25 ENCOUNTER — HOSPITAL ENCOUNTER (OUTPATIENT)
Dept: PHYSICAL THERAPY | Age: 53
Setting detail: THERAPIES SERIES
Discharge: HOME | End: 2018-10-25
Payer: COMMERCIAL

## 2018-10-25 PROCEDURE — 97110 THERAPEUTIC EXERCISES: CPT

## 2018-10-30 ENCOUNTER — HOSPITAL ENCOUNTER (OUTPATIENT)
Dept: PHYSICAL THERAPY | Age: 53
Setting detail: THERAPIES SERIES
Discharge: HOME | End: 2018-10-30
Payer: COMMERCIAL

## 2018-10-30 PROCEDURE — 97530 THERAPEUTIC ACTIVITIES: CPT

## 2018-10-30 PROCEDURE — 97110 THERAPEUTIC EXERCISES: CPT

## 2018-10-30 NOTE — FLOWSHEET NOTE
dance\"    Plan:   [x] Continue per plan of care [] Alter current plan (see comments)  [] Plan of care initiated [] Hold pending MD visit [] Discharge    Plan for Next Session:  Continue LE strengthening, stretching. Progress sit to stand, static standing tolerance (monitor tolerance and joint stability).       G-Code       Functional Assessment Tool Used TUG (81 sec w/ RW); 5 times sit to stand (50 sec)      Score       Functional Limitation Mobility: Walking and moving around      Mobility: Walking and Moving Around Current Status () At least 80 percent but less than 100 percent impaired, limited or restricted      Mobility: Walking and Moving Around Goal Status () At least 20 percent but less than 40 percent impaired, limited or restricted          Electronically signed by:  Rolf Page DPT

## 2018-11-01 ENCOUNTER — HOSPITAL ENCOUNTER (OUTPATIENT)
Dept: PHYSICAL THERAPY | Age: 53
Setting detail: THERAPIES SERIES
Discharge: HOME | End: 2018-11-01
Payer: COMMERCIAL

## 2018-11-06 ENCOUNTER — HOSPITAL ENCOUNTER (OUTPATIENT)
Dept: PHYSICAL THERAPY | Age: 53
Setting detail: THERAPIES SERIES
Discharge: HOME | End: 2018-11-06
Payer: COMMERCIAL

## 2018-11-06 PROCEDURE — 97110 THERAPEUTIC EXERCISES: CPT

## 2018-11-06 NOTE — FLOWSHEET NOTE
pain L > R. Pt attempts tp push through severe groin pain w/ movements that should not cause any pain in the groin. Redirected pt to alternative exercises, but pt continues to be in severe pain today. Location of pain could be suspicious for hernia as well. Encouraged pt to discuss w/ PCP next week while awaiting orthopedic appt at end of month. Will reassess pt tolerance next session. Will continue to address stretching and strengthening to improve joint stability before aggressively progressing WB/gait. Prognosis: [] Good [x] Fair  [] Poor    Patient Requires Follow-up: [x] Yes  [] No    Goals:  Short term goals  Time Frame for Short term goals: 4 weeks  Short term goal 1: Pt will demonstrate ability to perform HEP following instruction. Short term goal 2: Pt will improve B hip flex ROM to 90*, R knee flex to 95*, R knee ext to -8*, L knee ext to -15* to improve ability to perform sit to stand. Short term goal 3: Pt will improve sit to/from stand to mod I w/ RW. Long term goals  Time Frame for Long term goals : 8 weeks   Long term goal 1: Pt will improve 5 time sit to stand time to 20 sec to demonstrate improved functional mobility. Long term goal 2: Pt will improve B hip abd strength to 4/5 and B knee ext to 5/5 to improve gait mechanics. Long term goal 3: Pt will 150' w/ RW mod I.   Long term goal 4: Pt will improve TUG to  40 seconds w/ RW to demonstrate improved functional gait. Patient Goals   Patient goals : to walk in apt., upstairs, get to his garage that his 300' from house and \"get up and slow dance\"    Plan:   [x] Continue per plan of care [] Alter current plan (see comments)  [] Plan of care initiated [] Hold pending MD visit [] Discharge    Plan for Next Session:  Reassess pain and activity tolerance, Continue LE strengthening, stretching. Progress sit to stand, static standing tolerance (monitor tolerance and joint stability).       G-Code       Functional Assessment Tool Used TUG